# Patient Record
Sex: MALE | Race: WHITE | NOT HISPANIC OR LATINO | Employment: OTHER | URBAN - METROPOLITAN AREA
[De-identification: names, ages, dates, MRNs, and addresses within clinical notes are randomized per-mention and may not be internally consistent; named-entity substitution may affect disease eponyms.]

---

## 2017-02-09 ENCOUNTER — CLINICAL SUPPORT NO REQUIREMENTS (OUTPATIENT)
Dept: CARDIOLOGY | Facility: CLINIC | Age: 77
End: 2017-02-09

## 2017-02-09 DIAGNOSIS — I49.5 SICK SINUS SYNDROME (HCC): Primary | ICD-10-CM

## 2017-02-09 PROCEDURE — 93280 PM DEVICE PROGR EVAL DUAL: CPT | Performed by: INTERNAL MEDICINE

## 2017-03-17 RX ORDER — PRAVASTATIN SODIUM 40 MG
40 TABLET ORAL DAILY
Qty: 90 TABLET | Refills: 3 | Status: SHIPPED | OUTPATIENT
Start: 2017-03-17 | End: 2018-03-05 | Stop reason: SDUPTHER

## 2017-04-03 RX ORDER — LEVOTHYROXINE SODIUM 0.05 MG/1
TABLET ORAL
Qty: 90 TABLET | Refills: 0 | Status: SHIPPED | OUTPATIENT
Start: 2017-04-03 | End: 2017-12-15 | Stop reason: DRUGHIGH

## 2017-04-04 RX ORDER — LEVOTHYROXINE SODIUM 0.05 MG/1
TABLET ORAL
Qty: 90 TABLET | Refills: 1 | Status: SHIPPED | OUTPATIENT
Start: 2017-04-04 | End: 2017-04-04 | Stop reason: SDUPTHER

## 2017-04-04 RX ORDER — LEVOTHYROXINE SODIUM 0.05 MG/1
TABLET ORAL
Qty: 90 TABLET | Refills: 1 | Status: SHIPPED | OUTPATIENT
Start: 2017-04-04 | End: 2017-06-15

## 2017-06-14 ENCOUNTER — OFFICE VISIT (OUTPATIENT)
Dept: INTERNAL MEDICINE | Facility: CLINIC | Age: 77
End: 2017-06-14

## 2017-06-14 VITALS
RESPIRATION RATE: 18 BRPM | SYSTOLIC BLOOD PRESSURE: 110 MMHG | HEIGHT: 69 IN | DIASTOLIC BLOOD PRESSURE: 52 MMHG | HEART RATE: 77 BPM | WEIGHT: 157 LBS | BODY MASS INDEX: 23.25 KG/M2 | OXYGEN SATURATION: 98 %

## 2017-06-14 DIAGNOSIS — E78.49 OTHER HYPERLIPIDEMIA: Primary | ICD-10-CM

## 2017-06-14 DIAGNOSIS — E03.9 ACQUIRED HYPOTHYROIDISM: ICD-10-CM

## 2017-06-14 LAB
ALBUMIN SERPL-MCNC: 4.5 G/DL (ref 3.5–5.2)
ALBUMIN/GLOB SERPL: 2.3 G/DL
ALP SERPL-CCNC: 54 U/L (ref 40–129)
ALT SERPL-CCNC: 21 U/L (ref 5–41)
AST SERPL-CCNC: 39 U/L (ref 5–40)
BILIRUB SERPL-MCNC: 0.7 MG/DL (ref 0.2–1.2)
BUN SERPL-MCNC: 18 MG/DL (ref 8–23)
BUN/CREAT SERPL: 19.8 (ref 7–25)
CALCIUM SERPL-MCNC: 9.4 MG/DL (ref 8.8–10.5)
CHLORIDE SERPL-SCNC: 96 MMOL/L (ref 98–107)
CHOLEST SERPL-MCNC: 213 MG/DL (ref 0–200)
CHOLEST/HDLC SERPL: 2.29 {RATIO}
CO2 SERPL-SCNC: 26.9 MMOL/L (ref 22–29)
CREAT SERPL-MCNC: 0.91 MG/DL (ref 0.76–1.27)
ERYTHROCYTE [DISTWIDTH] IN BLOOD BY AUTOMATED COUNT: 12.2 % (ref 11.5–14.5)
GLOBULIN SER CALC-MCNC: 2 GM/DL
GLUCOSE SERPL-MCNC: 93 MG/DL (ref 65–99)
HCT VFR BLD AUTO: 41.6 % (ref 42–52)
HDLC SERPL-MCNC: 93 MG/DL (ref 40–60)
HGB BLD-MCNC: 14 G/DL (ref 14–18)
LDLC SERPL CALC-MCNC: 98 MG/DL (ref 0–100)
MCH RBC QN AUTO: 33 PG (ref 27–31)
MCHC RBC AUTO-ENTMCNC: 33.7 G/DL (ref 31–37)
MCV RBC AUTO: 98.1 FL (ref 80–94)
PLATELET # BLD AUTO: 239 10*3/MM3 (ref 140–500)
POTASSIUM SERPL-SCNC: 4.3 MMOL/L (ref 3.5–5.2)
PROT SERPL-MCNC: 6.5 G/DL (ref 6–8.5)
RBC # BLD AUTO: 4.24 10*6/MM3 (ref 4.7–6.1)
SODIUM SERPL-SCNC: 138 MMOL/L (ref 136–145)
T4 FREE SERPL-MCNC: 1.32 NG/DL (ref 0.93–1.7)
TRIGL SERPL-MCNC: 108 MG/DL (ref 0–150)
TSH SERPL DL<=0.005 MIU/L-ACNC: 4.8 MIU/ML (ref 0.27–4.2)
VLDLC SERPL CALC-MCNC: 21.6 MG/DL (ref 8–32)
WBC # BLD AUTO: 5.23 10*3/MM3 (ref 4.8–10.8)

## 2017-06-14 PROCEDURE — 99213 OFFICE O/P EST LOW 20 MIN: CPT | Performed by: INTERNAL MEDICINE

## 2017-06-14 NOTE — PATIENT INSTRUCTIONS
Assessment/Plan   Jean Claude was seen today for hyperlipidemia and hypothyroidism.    Diagnoses and all orders for this visit:    Other hyperlipidemia  -     Comprehensive metabolic panel  -     Lipid Panel w/ Chol/HDL Ratio  -     CBC No Differential    Acquired hypothyroidism  -     T4, free  -     TSH      Spinal exercises to help with diastasis that is mild and reducible. No current abdominal pain, but encourage some conditioning of the core.  Exercises discussed from [employer].

## 2017-06-14 NOTE — PROGRESS NOTES
Subjective   Jean Claude Ramirez is a 76 y.o. male.     History of Present Illness   75 yo male with pmhx HL, hypothyroidism, AR on immunotherapy biweekly and pacer in place for AV block.  His interogation is up to date  He is otherwise well, some weight gain but recent vacation.    The following portions of the patient's history were reviewed and updated as appropriate: allergies, current medications, past family history, past medical history, past social history, past surgical history and problem list.    Review of Systems   Constitutional: Negative.  Negative for activity change, appetite change, fatigue, fever and unexpected weight change.   HENT: Negative.  Negative for congestion, ear pain and voice change.    Respiratory: Negative.    Cardiovascular: Negative.  Negative for chest pain and palpitations.   Gastrointestinal: Negative.  Negative for diarrhea, nausea and vomiting.   Endocrine: Negative.    Genitourinary: Negative.  Negative for decreased urine volume and urgency.   Musculoskeletal: Negative.    Allergic/Immunologic: Negative.    Neurological: Negative for dizziness and headaches.   Hematological: Negative.    Psychiatric/Behavioral: Negative.    All other systems reviewed and are negative.      Objective   Physical Exam   HENT:   Head: Normocephalic.   Right Ear: External ear normal.   Left Ear: External ear normal.   Nose: Nose normal.   Mouth/Throat: Oropharynx is clear and moist.   Eyes: Pupils are equal, round, and reactive to light.   Cardiovascular: Normal rate and regular rhythm.    No murmur heard.  Abdominal: Soft.   Mild diastasis recti, reducible umbilical hernia.   No HSM   Psychiatric: He has a normal mood and affect. His behavior is normal.   Nursing note and vitals reviewed.      Assessment/Plan   Jean Claude was seen today for hyperlipidemia and hypothyroidism.    Diagnoses and all orders for this visit:    Other hyperlipidemia  -     Comprehensive metabolic panel  -     Lipid Panel w/ Chol/HDL  Ratio  -     CBC No Differential    Acquired hypothyroidism  -     T4, free  -     TSH      Spinal exercises to help with diastasis that is mild and reducible. No current abdominal pain, but encourage some conditioning of the core.  Exercises discussed from [email or web address].

## 2017-06-15 DIAGNOSIS — E03.9 ACQUIRED HYPOTHYROIDISM: Primary | ICD-10-CM

## 2017-06-15 RX ORDER — LEVOTHYROXINE SODIUM 0.07 MG/1
75 TABLET ORAL DAILY
Qty: 90 TABLET | Refills: 1 | Status: SHIPPED | OUTPATIENT
Start: 2017-06-15 | End: 2017-12-15 | Stop reason: SDUPTHER

## 2017-06-16 ENCOUNTER — TELEPHONE (OUTPATIENT)
Dept: INTERNAL MEDICINE | Facility: CLINIC | Age: 77
End: 2017-06-16

## 2017-06-16 NOTE — TELEPHONE ENCOUNTER
Patient notified, Dr. Davey sent meds into pharmacy yesterday.  ----- Message from Mahad Davey MD sent at 6/15/2017  8:33 AM EDT -----  Cholesterol is great. Need to tweak his levothyroxine a bit, go up to 75.  Remember his ab exercises.  Recheck 2-3 months labb only.  tsh and ft4

## 2017-08-22 ENCOUNTER — CLINICAL SUPPORT NO REQUIREMENTS (OUTPATIENT)
Dept: CARDIOLOGY | Facility: CLINIC | Age: 77
End: 2017-08-22

## 2017-08-22 ENCOUNTER — OFFICE VISIT (OUTPATIENT)
Dept: CARDIOLOGY | Facility: CLINIC | Age: 77
End: 2017-08-22

## 2017-08-22 VITALS
SYSTOLIC BLOOD PRESSURE: 146 MMHG | BODY MASS INDEX: 22.51 KG/M2 | HEIGHT: 69 IN | DIASTOLIC BLOOD PRESSURE: 82 MMHG | WEIGHT: 152 LBS | HEART RATE: 68 BPM

## 2017-08-22 DIAGNOSIS — E78.49 OTHER HYPERLIPIDEMIA: Primary | ICD-10-CM

## 2017-08-22 DIAGNOSIS — Z95.0 CARDIAC PACEMAKER: ICD-10-CM

## 2017-08-22 DIAGNOSIS — I49.5 SICK SINUS SYNDROME (HCC): ICD-10-CM

## 2017-08-22 PROCEDURE — 93280 PM DEVICE PROGR EVAL DUAL: CPT | Performed by: INTERNAL MEDICINE

## 2017-08-22 PROCEDURE — 99213 OFFICE O/P EST LOW 20 MIN: CPT | Performed by: INTERNAL MEDICINE

## 2017-08-24 PROCEDURE — 93000 ELECTROCARDIOGRAM COMPLETE: CPT | Performed by: INTERNAL MEDICINE

## 2017-08-24 NOTE — PROGRESS NOTES
Jean Claude NICO Page  1940  Date of Office Visit:   Encounter Provider: James Riddle MD  Place of Service: [facility] CARDIOLOGY    Chief complaint  1. Permanent dual chamber pacemaker.  2. High grade AV block.  3. Syncope.  History of Present Illness  Dear Dr. Davey:  I had the pleasure of seeing your patient in followup today. The patient is a very pleasant 77-year-old gentleman with a medical  history of what appears to be Mobitz II second degree heart block and perhaps even transient third degree AV block along with syncope. He underwent a Medtronic dual chamber pacemaker placement in January 2013 secondary to those findings. Since that time, he has  been doing very well and had no issues with his pacemaker.     In 06/2016 he was in the hospital at [facility] secondary to chest discomfort.  At that time, he underwent a stress test that showed no evidence of ischemia or infarction. He had a normal ejection fraction documented on his echo along with just mild mitral, aortic and tricuspid valve regurgitation.  Since that visit, he has been doing very well. He continues to be very active, and he has no issues with dyspnea on exertion or chest discomfort.      He denies any orthopnea, PND or lower extremity edema.  No recurrent syncope.              Review of Systems   Constitution: Negative for fever, weakness and malaise/fatigue.   HENT: Negative for nosebleeds and sore throat.    Eyes: Negative for blurred vision and double vision.   Cardiovascular: Negative for chest pain, claudication, palpitations and syncope.   Respiratory: Negative for cough, shortness of breath and snoring.    Endocrine: Negative for cold intolerance, heat intolerance and polydipsia.   Skin: Negative for itching, poor wound healing and rash.   Musculoskeletal: Negative for joint pain, joint swelling, muscle weakness and myalgias.   Gastrointestinal: Negative for abdominal pain, melena, nausea and  "vomiting.   Neurological: Negative for light-headedness, loss of balance, seizures and vertigo.   Psychiatric/Behavioral: Negative for altered mental status and depression.          Past Medical History:   Diagnosis Date   • Acquired hypothyroidism 5/24/2016   • Atypical chest pain 6/24/2016   • AV block    • Cancer 8/19/10    Prostate surgery   • H/O echocardiogram 01/25/2013   • Health care maintenance    • History of EKG 08/13/2015   • Hyperlipidemia 5/24/2016   • Mobitz type II block    • Pacemaker        The following portions of the patient's history were reviewed and updated as appropriate: Social history , Family history and Surgical history     Current Outpatient Prescriptions on File Prior to Visit   Medication Sig Dispense Refill   • ascorbic acid (VITAMIN C) 1000 MG tablet Take  by mouth daily.     • fexofenadine (ALLEGRA) 180 MG tablet Take 180 mg by mouth daily.     • fluticasone (FLONASE) 50 MCG/ACT nasal spray 2 sprays daily.     • levothyroxine (SYNTHROID) 75 MCG tablet Take 1 tablet by mouth Daily. 90 tablet 1   • levothyroxine (SYNTHROID, LEVOTHROID) 50 MCG tablet TAKE ONE TABLET BY MOUTH ONCE DAILY 90 tablet 0   • Multiple Vitamin tablet Take  by mouth.     • pravastatin (PRAVACHOL) 40 MG tablet Take 1 tablet by mouth Daily. 90 tablet 3     No current facility-administered medications on file prior to visit.        Allergies   Allergen Reactions   • Aspirin Anaphylaxis       Vitals:    08/22/17 1501   BP: 146/82   Pulse: 68   Weight: 152 lb (68.9 kg)   Height: 69\" (175.3 cm)     Physical Exam   Constitutional: He is oriented to person, place, and time. He appears well-developed and well-nourished.   HENT:   Head: Normocephalic and atraumatic.   Eyes: Conjunctivae and EOM are normal. No scleral icterus.   Neck: Normal range of motion. Neck supple. Normal carotid pulses, no hepatojugular reflux and no JVD present. Carotid bruit is not present. No tracheal deviation present. No thyromegaly present. "   Cardiovascular: Normal rate and regular rhythm.  Exam reveals no gallop and no friction rub.    No murmur heard.  Pulses:       Carotid pulses are 2+ on the right side, and 2+ on the left side.       Radial pulses are 2+ on the right side, and 2+ on the left side.        Femoral pulses are 2+ on the right side, and 2+ on the left side.       Dorsalis pedis pulses are 2+ on the right side, and 2+ on the left side.        Posterior tibial pulses are 2+ on the right side, and 2+ on the left side.   Pulmonary/Chest: Breath sounds normal. No respiratory distress. He has no decreased breath sounds. He has no wheezes. He has no rhonchi. He has no rales. He exhibits no tenderness.   Abdominal: Soft. Bowel sounds are normal. He exhibits no distension. There is no tenderness. There is no rebound.   Musculoskeletal: He exhibits no edema or deformity.   Neurological: He is alert and oriented to person, place, and time. He has normal strength. No sensory deficit.   Skin: No rash noted. No erythema.        Psychiatric: He has a normal mood and affect. His behavior is normal.       No components found for: CBC  No results found for: CMP  No components found for: LIPID  No results found for: BMP      ECG 12 Lead  Date/Time: 8/24/2017 6:38 PM  Performed by: GABBY ZAYAS  Authorized by: GABBY ZAYAS   Comparison: compared with previous ECG from 8/4/2016  Similar to previous ECG  Comments: Atrial paced. Ventricular sensed                  6/22/16 Stress  IMPRESSION:   1. No scintigraphic evidence of Lexiscan-induced ischemia.   2. No scintigraphic evidence of prior myocardial infarction.   3. Preserved left ventricular ejection fraction at 78% with normal wall   motion and thickness.   4. Normal Lexiscan Cardiolite stress test.     TTE 6/22/16  Conclusions:  The left ventricular chamber size is normal.  Borderline concentric left ventricular hypertrophy.  The estimated ejection fraction is 60-65%.   There is an E  to A reversal in the mitral valve flow pattern suggestive  of diastolic dysfunction.  The left atrium is mildly enlarged.  A pacemaker wire is visualized in the right ventricle.  There is mild aortic regurgitation.   There is mild mitral regurgitation.   There is mild tricuspid regurgitation.  The right ventricular systolic pressure is estimated to be 35-40 mmHg.   There is evidence of mild pulmonary hypertension.    DISCUSSION/SUMMARY   77-year-old gentleman with a medical history as documented above including AV block, pacemaker placement as documented above, and recent evaluation for chest pain in 06/2016 who presents back for followup.  He remains active, walking 2-3 miles a day without any dyspnea either.  Overall, I feel like he is doing well and I do not think he needs any alteration of his medical management.        1. Dual chamber Medtronic pacemaker placement secondary to AV block.  Continue pacemaker monitoring in our clinic.      2. Hyperlipidemia:  The patient is currently on Pravastatin at 40 mg po q.hs.  He recently had a lipid panel in 06/2017 that  showed his HDL was great at 93, and his LDL was reasonable at 98.  I do not think he needs alteration of that therapy at this  point in time.      I will plan on seeing him back in one year, or earlier if problems.

## 2017-09-20 ENCOUNTER — LAB (OUTPATIENT)
Dept: INTERNAL MEDICINE | Facility: CLINIC | Age: 77
End: 2017-09-20

## 2017-09-20 DIAGNOSIS — E03.9 HYPOTHYROIDISM, UNSPECIFIED TYPE: Primary | ICD-10-CM

## 2017-09-20 LAB
T4 FREE SERPL-MCNC: 1.4 NG/DL (ref 0.93–1.7)
TSH SERPL DL<=0.005 MIU/L-ACNC: 1.36 MIU/ML (ref 0.27–4.2)

## 2017-09-21 ENCOUNTER — TELEPHONE (OUTPATIENT)
Dept: INTERNAL MEDICINE | Facility: CLINIC | Age: 77
End: 2017-09-21

## 2017-09-21 NOTE — TELEPHONE ENCOUNTER
Left message with results on patients voicemail.   ----- Message from Mahad Davey MD sent at 9/20/2017  4:58 PM EDT -----  Thyroid is now good

## 2017-11-29 ENCOUNTER — CLINICAL SUPPORT NO REQUIREMENTS (OUTPATIENT)
Dept: CARDIOLOGY | Facility: CLINIC | Age: 77
End: 2017-11-29

## 2017-11-29 DIAGNOSIS — I49.5 SICK SINUS SYNDROME (HCC): Primary | ICD-10-CM

## 2017-11-29 PROCEDURE — 93296 REM INTERROG EVL PM/IDS: CPT | Performed by: INTERNAL MEDICINE

## 2017-11-29 PROCEDURE — 93294 REM INTERROG EVL PM/LDLS PM: CPT | Performed by: INTERNAL MEDICINE

## 2017-12-15 ENCOUNTER — OFFICE VISIT (OUTPATIENT)
Dept: INTERNAL MEDICINE | Facility: CLINIC | Age: 77
End: 2017-12-15

## 2017-12-15 VITALS
SYSTOLIC BLOOD PRESSURE: 144 MMHG | HEIGHT: 69 IN | RESPIRATION RATE: 16 BRPM | OXYGEN SATURATION: 96 % | HEART RATE: 73 BPM | DIASTOLIC BLOOD PRESSURE: 76 MMHG | WEIGHT: 154.4 LBS | BODY MASS INDEX: 22.87 KG/M2

## 2017-12-15 DIAGNOSIS — E03.9 ACQUIRED HYPOTHYROIDISM: ICD-10-CM

## 2017-12-15 DIAGNOSIS — Z00.00 MEDICARE ANNUAL WELLNESS VISIT, SUBSEQUENT: Primary | ICD-10-CM

## 2017-12-15 DIAGNOSIS — C61 PROSTATE CANCER (HCC): ICD-10-CM

## 2017-12-15 DIAGNOSIS — E78.49 OTHER HYPERLIPIDEMIA: ICD-10-CM

## 2017-12-15 LAB
ALBUMIN SERPL-MCNC: 4.5 G/DL (ref 3.5–5.2)
ALBUMIN/GLOB SERPL: 2 G/DL
ALP SERPL-CCNC: 49 U/L (ref 40–129)
ALT SERPL-CCNC: 15 U/L (ref 5–41)
AST SERPL-CCNC: 27 U/L (ref 5–40)
BASOPHILS # BLD AUTO: 0.04 10*3/MM3 (ref 0–0.2)
BASOPHILS NFR BLD AUTO: 0.9 % (ref 0–2)
BILIRUB SERPL-MCNC: 0.7 MG/DL (ref 0.2–1.2)
BUN SERPL-MCNC: 14 MG/DL (ref 8–23)
BUN/CREAT SERPL: 15.9 (ref 7–25)
CALCIUM SERPL-MCNC: 9.6 MG/DL (ref 8.8–10.5)
CHLORIDE SERPL-SCNC: 98 MMOL/L (ref 98–107)
CHOLEST SERPL-MCNC: 194 MG/DL (ref 0–200)
CO2 SERPL-SCNC: 31.5 MMOL/L (ref 22–29)
CREAT SERPL-MCNC: 0.88 MG/DL (ref 0.76–1.27)
EOSINOPHIL # BLD AUTO: 0.32 10*3/MM3 (ref 0.1–0.3)
EOSINOPHIL NFR BLD AUTO: 7.1 % (ref 0–4)
ERYTHROCYTE [DISTWIDTH] IN BLOOD BY AUTOMATED COUNT: 11.8 % (ref 11.5–14.5)
GFR SERPLBLD CREATININE-BSD FMLA CKD-EPI: 102 ML/MIN/1.73
GFR SERPLBLD CREATININE-BSD FMLA CKD-EPI: 84 ML/MIN/1.73
GLOBULIN SER CALC-MCNC: 2.2 GM/DL
GLUCOSE SERPL-MCNC: 88 MG/DL (ref 65–99)
HCT VFR BLD AUTO: 41.8 % (ref 42–52)
HDLC SERPL-MCNC: 82 MG/DL (ref 40–60)
HGB BLD-MCNC: 14 G/DL (ref 14–18)
IMM GRANULOCYTES # BLD: 0.01 10*3/MM3 (ref 0–0.03)
IMM GRANULOCYTES NFR BLD: 0.2 % (ref 0–0.5)
LDLC SERPL CALC-MCNC: 98 MG/DL (ref 0–100)
LDLC/HDLC SERPL: 1.19 {RATIO}
LYMPHOCYTES # BLD AUTO: 1.39 10*3/MM3 (ref 0.6–4.8)
LYMPHOCYTES NFR BLD AUTO: 30.9 % (ref 20–45)
MCH RBC QN AUTO: 33.8 PG (ref 27–31)
MCHC RBC AUTO-ENTMCNC: 33.5 G/DL (ref 31–37)
MCV RBC AUTO: 101 FL (ref 80–94)
MONOCYTES # BLD AUTO: 0.5 10*3/MM3 (ref 0–1)
MONOCYTES NFR BLD AUTO: 11.1 % (ref 3–8)
NEUTROPHILS # BLD AUTO: 2.24 10*3/MM3 (ref 1.5–8.3)
NEUTROPHILS NFR BLD AUTO: 49.8 % (ref 45–70)
NRBC BLD AUTO-RTO: 0 /100 WBC (ref 0–0)
PLATELET # BLD AUTO: 196 10*3/MM3 (ref 140–500)
POTASSIUM SERPL-SCNC: 4.6 MMOL/L (ref 3.5–5.2)
PROT SERPL-MCNC: 6.7 G/DL (ref 6–8.5)
PSA SERPL-MCNC: <0.014 NG/ML (ref 0–4)
RBC # BLD AUTO: 4.14 10*6/MM3 (ref 4.7–6.1)
SODIUM SERPL-SCNC: 139 MMOL/L (ref 136–145)
TRIGL SERPL-MCNC: 72 MG/DL (ref 0–150)
TSH SERPL DL<=0.005 MIU/L-ACNC: 1.27 MIU/ML (ref 0.27–4.2)
VLDLC SERPL CALC-MCNC: 14.4 MG/DL (ref 8–32)
WBC # BLD AUTO: 4.5 10*3/MM3 (ref 4.8–10.8)

## 2017-12-15 PROCEDURE — 99213 OFFICE O/P EST LOW 20 MIN: CPT | Performed by: INTERNAL MEDICINE

## 2017-12-15 PROCEDURE — G0439 PPPS, SUBSEQ VISIT: HCPCS | Performed by: INTERNAL MEDICINE

## 2017-12-15 RX ORDER — LEVOTHYROXINE SODIUM 0.07 MG/1
TABLET ORAL
Qty: 90 TABLET | Refills: 1 | Status: SHIPPED | OUTPATIENT
Start: 2017-12-15 | End: 2018-06-14 | Stop reason: SDUPTHER

## 2017-12-15 NOTE — PROGRESS NOTES
QUICK REFERENCE INFORMATION:  The ABCs of the Annual Wellness Visit    Subsequent Medicare Wellness Visit    HEALTH RISK ASSESSMENT    1940    Recent Hospitalizations:  No hospitalization(s) within the last year..        Current Medical Providers:  Patient Care Team:  Mahad Davey MD as PCP - General  Mahad Davey MD as PCP - Claims Attributed        Smoking Status:  History   Smoking Status   • Never Smoker   Smokeless Tobacco   • Not on file       Alcohol Consumption:  History   Alcohol Use   • Yes     Comment: Social Drinker       Depression Screen:   PHQ-2/PHQ-9 Depression Screening 12/15/2017   Little interest or pleasure in doing things 0   Feeling down, depressed, or hopeless 0   Total Score 0       Health Habits and Functional and Cognitive Screening:  Functional & Cognitive Status 12/15/2017   Do you have difficulty preparing food and eating? No   Do you have difficulty bathing yourself, getting dressed or grooming yourself? No   Do you have difficulty using the toilet? No   Do you have difficulty moving around from place to place? No   Do you have trouble with steps or getting out of a bed or a chair? No   In the past year have you fallen or experienced a near fall? No   Do you need help using the phone?  No   Are you deaf or do you have serious difficulty hearing?  No   Do you need help with transportation? No   Do you need help shopping? No   Do you need help preparing meals?  No   Do you need help with housework?  No   Do you need help with laundry? No   Do you need help taking your medications? No   Do you need help managing money? No   Have you felt unusual stress, anger or loneliness in the last month? No   Who do you live with? Spouse   If you need help, do you have trouble finding someone available to you? Yes   Have you been bothered in the last four weeks by sexual problems? No   Do you have difficulty concentrating, remembering or making decisions? No           Does the patient have  evidence of cognitive impairment? No, he is less sharp with names    Aspirin use counseling: Does not need ASA (and currently is not on it)      Recent Lab Results:  CMP:  Lab Results   Component Value Date    GLU 93 06/14/2017    BUN 18 06/14/2017    CREATININE 0.91 06/14/2017    EGFRIFNONA 81 06/14/2017    EGFRIFAFRI 98 06/14/2017    BCR 19.8 06/14/2017     06/14/2017    K 4.3 06/14/2017    CO2 26.9 06/14/2017    CALCIUM 9.4 06/14/2017    PROTENTOTREF 6.5 06/14/2017    ALBUMIN 4.50 06/14/2017    LABGLOBREF 2.0 06/14/2017    LABIL2 2.3 06/14/2017    BILITOT 0.7 06/14/2017    ALKPHOS 54 06/14/2017    AST 39 06/14/2017    ALT 21 06/14/2017     Lipid Panel:  Lab Results   Component Value Date    TRIG 108 06/14/2017    HDL 93 (H) 06/14/2017    VLDL 21.6 06/14/2017     HbA1c:       Visual Acuity:  No exam data present    Age-appropriate Screening Schedule:  Refer to the list below for future screening recommendations based on patient's age, sex and/or medical conditions. Orders for these recommended tests are listed in the plan section. The patient has been provided with a written plan.    Health Maintenance   Topic Date Due   • PNEUMOCOCCAL VACCINES (65+ LOW/MEDIUM RISK) (2 of 2 - PPSV23) 04/13/2017   • LIPID PANEL  06/14/2018   • TDAP/TD VACCINES (2 - Td) 10/03/2022   • COLONOSCOPY  07/18/2026   • INFLUENZA VACCINE  Completed   • ZOSTER VACCINE  Addressed        Subjective   History of Present Illness    Jean Claude Ramirez is a 77 y.o. male who presents for an Subsequent Wellness Visit.  Mr Ramirez is also here for FU of his hypothyroidism ( no cp or palpitations) and HL, would like fasting labs done today.    He denies any new myalgia or joint pain    He remains very active exercising daily.    The following portions of the patient's history were reviewed and updated as appropriate: allergies, current medications, past family history, past medical history, past social history, past surgical history and problem  list.    Outpatient Medications Prior to Visit   Medication Sig Dispense Refill   • ascorbic acid (VITAMIN C) 1000 MG tablet Take  by mouth daily.     • fexofenadine (ALLEGRA) 180 MG tablet Take 180 mg by mouth daily.     • fluticasone (FLONASE) 50 MCG/ACT nasal spray 2 sprays daily.     • levothyroxine (SYNTHROID) 75 MCG tablet Take 1 tablet by mouth Daily. 90 tablet 1   • Multiple Vitamin tablet Take  by mouth.     • pravastatin (PRAVACHOL) 40 MG tablet Take 1 tablet by mouth Daily. 90 tablet 3   • levothyroxine (SYNTHROID, LEVOTHROID) 50 MCG tablet TAKE ONE TABLET BY MOUTH ONCE DAILY 90 tablet 0     No facility-administered medications prior to visit.        Patient Active Problem List   Diagnosis   • Acquired hypothyroidism   • Hyperlipidemia   • Syncope   • Atypical chest pain   • Cardiac pacemaker   • Medicare annual wellness visit, subsequent       Advance Care Planning:  power of  for healthcare on file, wife     Identification of Risk Factors:  Risk factors include: none, very active doing 2miles per day. Does concurrent spine strenghtening exercises, compliant with plank    Review of Systems   Constitutional: Negative.    HENT: Negative.    Respiratory: Negative.    Cardiovascular: Negative.    Genitourinary: Negative.    Musculoskeletal: Negative.    Psychiatric/Behavioral: Negative.        Compared to one year ago, the patient feels his physical health is the same.  Compared to one year ago, the patient feels his mental health is the same.    Objective     Physical Exam   Constitutional: He appears well-developed and well-nourished.   HENT:   Head: Normocephalic and atraumatic.   Right Ear: External ear normal.   Left Ear: External ear normal.   Mouth/Throat: No oropharyngeal exudate.   Eyes: EOM are normal. Pupils are equal, round, and reactive to light.   Neck: No thyromegaly present.   Cardiovascular: Normal rate and regular rhythm.    No murmur heard.  Pulmonary/Chest: Effort normal. No  "respiratory distress.   Neurological: He is alert.   Psychiatric: He has a normal mood and affect. His behavior is normal.   Vitals reviewed.      Vitals:    12/15/17 0847   BP: 144/76   Pulse: 73   Resp: 16   SpO2: 96%   Weight: 70 kg (154 lb 6.4 oz)   Height: 175.3 cm (69\")       Body mass index is 22.8 kg/(m^2).  Discussed the patient's BMI with him. BMI is within normal parameters. No follow-up required.    Assessment/Plan   Patient Self-Management and Personalized Health Advice  The patient has been provided with information about: nothing doing great and preventive services including:   · discussed new shingles vaccine, defer for now.    Visit Diagnoses:    ICD-10-CM ICD-9-CM   1. Medicare annual wellness visit, subsequent Z00.00 V70.0   2. Other hyperlipidemia E78.4 272.4   3. Acquired hypothyroidism E03.9 244.9   4. Prostate cancer C61 185       Orders Placed This Encounter   Procedures   • Comprehensive metabolic panel   • Lipid Panel With LDL/HDL Ratio   • TSH   • PSA   • CBC w AUTO Differential     Order Specific Question:   Manual Differential     Answer:   No       Outpatient Encounter Prescriptions as of 12/15/2017   Medication Sig Dispense Refill   • ascorbic acid (VITAMIN C) 1000 MG tablet Take  by mouth daily.     • fexofenadine (ALLEGRA) 180 MG tablet Take 180 mg by mouth daily.     • fluticasone (FLONASE) 50 MCG/ACT nasal spray 2 sprays daily.     • levothyroxine (SYNTHROID) 75 MCG tablet Take 1 tablet by mouth Daily. 90 tablet 1   • Multiple Vitamin tablet Take  by mouth.     • pravastatin (PRAVACHOL) 40 MG tablet Take 1 tablet by mouth Daily. 90 tablet 3   • [DISCONTINUED] levothyroxine (SYNTHROID, LEVOTHROID) 50 MCG tablet TAKE ONE TABLET BY MOUTH ONCE DAILY 90 tablet 0     No facility-administered encounter medications on file as of 12/15/2017.        Reviewed use of high risk medication in the elderly: yes  Reviewed for potential of harmful drug interactions in the elderly: yes    Follow " Up:  No Follow-up on file.     An After Visit Summary and PPPS with all of these plans were given to the patient.      HL - check lipid today  Continue and refill pravastatin 40 mg po qhs    Hypothyroidism, check TSH and fT4, call with result, fill pending result.

## 2017-12-15 NOTE — PATIENT INSTRUCTIONS
Medicare Wellness  Personal Prevention Plan of Service     Date of Office Visit:  12/15/2017  Encounter Provider:  Mahad Davey MD  Place of Service:  [facility] INTERNAL MED AND PEDS  Patient Name: Jean Claude Ramirez  :  1940    As part of the Medicare Wellness portion of your visit today, we are providing you with this personalized preventive plan of services (PPPS). This plan is based upon recommendations of the [place] Preventive Services Task Force (USPSTF) and the Advisory Committee on Immunization Practices (ACIP).    This lists the preventive care services that should be considered, and provides dates of when you are due. Items listed as completed are up-to-date and do not require any further intervention.    Health Maintenance   Topic Date Due   • MEDICARE ANNUAL WELLNESS  10/15/2016   • PNEUMOCOCCAL VACCINES (65+ LOW/MEDIUM RISK) (2 of 2 - PPSV23) 2017   • LIPID PANEL  2018   • TDAP/TD VACCINES (2 - Td) 10/03/2022   • COLONOSCOPY  2026   • INFLUENZA VACCINE  Completed   • ZOSTER VACCINE  Addressed       Orders Placed This Encounter   Procedures   • Comprehensive metabolic panel   • Lipid Panel With LDL/HDL Ratio   • TSH   • PSA   • CBC w AUTO Differential     Order Specific Question:   Manual Differential     Answer:   No       No Follow-up on file.

## 2017-12-20 ENCOUNTER — TELEPHONE (OUTPATIENT)
Dept: INTERNAL MEDICINE | Facility: CLINIC | Age: 77
End: 2017-12-20

## 2017-12-20 NOTE — TELEPHONE ENCOUNTER
Patient advised of results.   ----- Message from Mahad Davey MD sent at 12/19/2017  4:57 PM EST -----  Labs are really good, reiterate need for B complex as he is looking b12 deficient

## 2018-02-01 RX ORDER — OSELTAMIVIR PHOSPHATE 75 MG/1
75 CAPSULE ORAL DAILY
Qty: 10 CAPSULE | Refills: 0 | Status: SHIPPED | OUTPATIENT
Start: 2018-02-01 | End: 2018-08-17

## 2018-03-02 ENCOUNTER — CLINICAL SUPPORT NO REQUIREMENTS (OUTPATIENT)
Dept: CARDIOLOGY | Facility: CLINIC | Age: 78
End: 2018-03-02

## 2018-03-02 DIAGNOSIS — I49.5 SICK SINUS SYNDROME (HCC): Primary | ICD-10-CM

## 2018-03-02 PROCEDURE — 93280 PM DEVICE PROGR EVAL DUAL: CPT | Performed by: INTERNAL MEDICINE

## 2018-03-05 RX ORDER — PRAVASTATIN SODIUM 40 MG
TABLET ORAL
Qty: 90 TABLET | Refills: 3 | Status: SHIPPED | OUTPATIENT
Start: 2018-03-05 | End: 2019-02-24 | Stop reason: SDUPTHER

## 2018-06-04 ENCOUNTER — CLINICAL SUPPORT NO REQUIREMENTS (OUTPATIENT)
Dept: CARDIOLOGY | Facility: CLINIC | Age: 78
End: 2018-06-04

## 2018-06-04 DIAGNOSIS — I49.5 SICK SINUS SYNDROME (HCC): Primary | ICD-10-CM

## 2018-06-04 PROCEDURE — 93296 REM INTERROG EVL PM/IDS: CPT | Performed by: INTERNAL MEDICINE

## 2018-06-04 PROCEDURE — 93294 REM INTERROG EVL PM/LDLS PM: CPT | Performed by: INTERNAL MEDICINE

## 2018-06-14 DIAGNOSIS — E03.9 ACQUIRED HYPOTHYROIDISM: ICD-10-CM

## 2018-06-14 RX ORDER — LEVOTHYROXINE SODIUM 0.07 MG/1
TABLET ORAL
Qty: 90 TABLET | Refills: 1 | Status: SHIPPED | OUTPATIENT
Start: 2018-06-14 | End: 2018-12-06 | Stop reason: SDUPTHER

## 2018-08-16 ENCOUNTER — TELEPHONE (OUTPATIENT)
Dept: INTERNAL MEDICINE | Facility: CLINIC | Age: 78
End: 2018-08-16

## 2018-08-16 NOTE — TELEPHONE ENCOUNTER
----- Message from Magali Chavis MA sent at 8/16/2018  1:38 PM EDT -----  Regarding: FW: X-RAY VS APPT FIRST   Sorry I am just now able to reply to this, if someone has an opening for tomorrow we need to work him in..If not, can we just call and advise him to go to maybe [facility] or something similar?  ----- Message -----  From: Judith Esquivel MA  Sent: 8/15/2018   3:38 PM  To: Magali Chavis MA  Subject: FW: X-RAY VS APPT FIRST                              ----- Message -----  From: Lyudmila Bhardwaj  Sent: 8/15/2018   3:11 PM  To: [facility]  Subject: X-RAY VS APPT FIRST                              JEREMIAH    Patient has swollen left foot on the sole and swollen 2nd toe.  Does not know of any injury.  Walks in the morning and was unable to complete walk today.  Sees Dr. Davey but is okay seeing any other provider.  Pt asks if he should have x-ray or see provider.    [phone]    Pt is available Thursday afternoon and potentially Friday.

## 2018-08-17 ENCOUNTER — HOSPITAL ENCOUNTER (OUTPATIENT)
Dept: GENERAL RADIOLOGY | Facility: HOSPITAL | Age: 78
Discharge: HOME | End: 2018-08-17
Attending: INTERNAL MEDICINE | Admitting: INTERNAL MEDICINE

## 2018-08-17 ENCOUNTER — OFFICE VISIT (OUTPATIENT)
Dept: INTERNAL MEDICINE | Facility: CLINIC | Age: 78
End: 2018-08-17

## 2018-08-17 VITALS
BODY MASS INDEX: 22.36 KG/M2 | OXYGEN SATURATION: 99 % | RESPIRATION RATE: 14 BRPM | HEART RATE: 78 BPM | SYSTOLIC BLOOD PRESSURE: 134 MMHG | DIASTOLIC BLOOD PRESSURE: 70 MMHG | HEIGHT: 69 IN | WEIGHT: 151 LBS

## 2018-08-17 DIAGNOSIS — M79.675 TOE PAIN, LEFT: Primary | ICD-10-CM

## 2018-08-17 DIAGNOSIS — M79.675 PAIN AND SWELLING OF TOE OF LEFT FOOT: ICD-10-CM

## 2018-08-17 DIAGNOSIS — M79.89 PAIN AND SWELLING OF TOE OF LEFT FOOT: ICD-10-CM

## 2018-08-17 PROCEDURE — 99214 OFFICE O/P EST MOD 30 MIN: CPT | Performed by: INTERNAL MEDICINE

## 2018-08-17 PROCEDURE — 73660 X-RAY EXAM OF TOE(S): CPT

## 2018-08-17 NOTE — PROGRESS NOTES
X-ray of the toe is all normal except some minimal soft tissue swelling. F/u with podiatry. Either can see Dr. Robin or Sathya in [place] close to where he lives.

## 2018-08-17 NOTE — PROGRESS NOTES
"Jean Claude Ramirez is a 78 y.o. male, who presents with a chief complaint of   Chief Complaint   Patient presents with   • Toe Pain     left        79 yo M with history gout (mainly in right foot) here with left 2nd toe pain, redness and swelling that started Wednesday morning. Tender to touch and still somewhat painful to walk on which is better. He took a couple Tylenol that helped with the pain. No trauma.          The following portions of the patient's history were reviewed and updated as appropriate: allergies, current medications, past family history, past medical history, past social history, past surgical history and problem list.    Allergies: Aspirin    Current Outpatient Prescriptions:   •  ascorbic acid (VITAMIN C) 1000 MG tablet, Take  by mouth daily., Disp: , Rfl:   •  fexofenadine (ALLEGRA) 180 MG tablet, Take 180 mg by mouth daily., Disp: , Rfl:   •  fluticasone (FLONASE) 50 MCG/ACT nasal spray, 2 sprays daily., Disp: , Rfl:   •  levothyroxine (SYNTHROID, LEVOTHROID) 75 MCG tablet, TAKE ONE TABLET BY MOUTH ONCE DAILY, Disp: 90 tablet, Rfl: 1  •  Multiple Vitamin tablet, Take  by mouth., Disp: , Rfl:   •  pravastatin (PRAVACHOL) 40 MG tablet, TAKE ONE TABLET BY MOUTH ONCE DAILY, Disp: 90 tablet, Rfl: 3  Medications Discontinued During This Encounter   Medication Reason   • oseltamivir (TAMIFLU) 75 MG capsule *Therapy completed       Review of Systems   Constitutional: Negative for chills, fatigue and fever.   Musculoskeletal: Positive for arthralgias (2nd digit toe swelling and pain that has resolved ) and gait problem.             /70   Pulse 78   Resp 14   Ht 175.3 cm (69\")   Wt 68.5 kg (151 lb)   SpO2 99%   BMI 22.30 kg/m²       Physical Exam   Constitutional: He is oriented to person, place, and time. He appears well-developed and well-nourished. No distress.   HENT:   Head: Normocephalic and atraumatic.   Right Ear: External ear normal.   Left Ear: External ear normal.   Mouth/Throat: " Oropharynx is clear and moist. No oropharyngeal exudate.   Eyes: Conjunctivae are normal. Right eye exhibits no discharge. Left eye exhibits no discharge. No scleral icterus.   Pulmonary/Chest: Effort normal. He has no rales.   Musculoskeletal:   Left 2nd toe reduced ROM as well as some swelling at base with formation of hard dime size lesion. Possible tophus versus cyst.    Neurological: He is alert and oriented to person, place, and time.   Skin: Skin is warm. Capillary refill takes less than 2 seconds. No rash noted.   Psychiatric: He has a normal mood and affect. His behavior is normal.   Nursing note and vitals reviewed.        Results for orders placed or performed in visit on 12/15/17   Comprehensive metabolic panel   Result Value Ref Range    Glucose 88 65 - 99 mg/dL    BUN 14 8 - 23 mg/dL    Creatinine 0.88 0.76 - 1.27 mg/dL    eGFR Non African Am 84 >60 mL/min/1.73    eGFR African Am 102 >60 mL/min/1.73    BUN/Creatinine Ratio 15.9 7.0 - 25.0    Sodium 139 136 - 145 mmol/L    Potassium 4.6 3.5 - 5.2 mmol/L    Chloride 98 98 - 107 mmol/L    Total CO2 31.5 (H) 22.0 - 29.0 mmol/L    Calcium 9.6 8.8 - 10.5 mg/dL    Total Protein 6.7 6.0 - 8.5 g/dL    Albumin 4.50 3.50 - 5.20 g/dL    Globulin 2.2 gm/dL    A/G Ratio 2.0 g/dL    Total Bilirubin 0.7 0.2 - 1.2 mg/dL    Alkaline Phosphatase 49 40 - 129 U/L    AST (SGOT) 27 5 - 40 U/L    ALT (SGPT) 15 5 - 41 U/L   Lipid Panel With LDL/HDL Ratio   Result Value Ref Range    Total Cholesterol 194 0 - 200 mg/dL    Triglycerides 72 0 - 150 mg/dL    HDL Cholesterol 82 (H) 40 - 60 mg/dL    VLDL Cholesterol 14.4 8 - 32 mg/dL    LDL Cholesterol  98 0 - 100 mg/dL    LDL/HDL Ratio 1.19    TSH   Result Value Ref Range    TSH 1.270 0.270 - 4.200 mIU/mL   PSA   Result Value Ref Range    PSA <0.014 0.000 - 4.000 ng/mL   CBC w AUTO Differential   Result Value Ref Range    WBC 4.50 (L) 4.80 - 10.80 10*3/mm3    RBC 4.14 (L) 4.70 - 6.10 10*6/mm3    Hemoglobin 14.0 14.0 - 18.0 g/dL     Hematocrit 41.8 (L) 42.0 - 52.0 %    .0 (H) 80.0 - 94.0 fL    MCH 33.8 (H) 27.0 - 31.0 pg    MCHC 33.5 31.0 - 37.0 g/dL    RDW 11.8 11.5 - 14.5 %    Platelets 196 140 - 500 10*3/mm3    Neutrophil Rel % 49.8 45.0 - 70.0 %    Lymphocyte Rel % 30.9 20.0 - 45.0 %    Monocyte Rel % 11.1 (H) 3.0 - 8.0 %    Eosinophil Rel % 7.1 (H) 0.0 - 4.0 %    Basophil Rel % 0.9 0.0 - 2.0 %    Neutrophils Absolute 2.24 1.50 - 8.30 10*3/mm3    Lymphocytes Absolute 1.39 0.60 - 4.80 10*3/mm3    Monocytes Absolute 0.50 0.00 - 1.00 10*3/mm3    Eosinophils Absolute 0.32 (H) 0.10 - 0.30 10*3/mm3    Basophils Absolute 0.04 0.00 - 0.20 10*3/mm3    Immature Granulocyte Rel % 0.2 0.0 - 0.5 %    Immature Grans Absolute 0.01 0.00 - 0.03 10*3/mm3    nRBC 0.0 0.0 - 0.0 /100 WBC           Jean Claude was seen today for toe pain.    Diagnoses and all orders for this visit:    Toe pain, left  -     XR toe 2+ vw left    Pain and swelling of toe of left foot  -     XR toe 2+ vw left      X-ray to evaluate for cyst or typhi  Based on findings, may refer to podiatry in [place]   Supportive care and tylenol as needed for pain     Return if symptoms worsen or fail to improve.    Cheryl Cordero MD  08/17/2018

## 2018-08-20 ENCOUNTER — TELEPHONE (OUTPATIENT)
Dept: INTERNAL MEDICINE | Facility: CLINIC | Age: 78
End: 2018-08-20

## 2018-08-20 NOTE — TELEPHONE ENCOUNTER
LVM with details and to call with questions or concerns.     ----- Message from Cheryl Cordero MD sent at 8/17/2018 11:49 AM EDT -----  X-ray of the toe is all normal except some minimal soft tissue swelling. F/u with podiatry. Either can see Dr. Robin or Sathya in [place] close to where he lives.

## 2018-08-23 ENCOUNTER — CLINICAL SUPPORT NO REQUIREMENTS (OUTPATIENT)
Dept: CARDIOLOGY | Facility: CLINIC | Age: 78
End: 2018-08-23

## 2018-08-23 ENCOUNTER — OFFICE VISIT (OUTPATIENT)
Dept: CARDIOLOGY | Facility: CLINIC | Age: 78
End: 2018-08-23

## 2018-08-23 VITALS
WEIGHT: 151 LBS | BODY MASS INDEX: 22.36 KG/M2 | HEART RATE: 71 BPM | DIASTOLIC BLOOD PRESSURE: 78 MMHG | HEIGHT: 69 IN | SYSTOLIC BLOOD PRESSURE: 136 MMHG

## 2018-08-23 DIAGNOSIS — Z95.0 PACEMAKER: Primary | ICD-10-CM

## 2018-08-23 DIAGNOSIS — Z95.0 CARDIAC PACEMAKER: ICD-10-CM

## 2018-08-23 DIAGNOSIS — I49.5 SICK SINUS SYNDROME (HCC): Primary | ICD-10-CM

## 2018-08-23 DIAGNOSIS — E78.49 OTHER HYPERLIPIDEMIA: ICD-10-CM

## 2018-08-23 PROCEDURE — 99213 OFFICE O/P EST LOW 20 MIN: CPT | Performed by: INTERNAL MEDICINE

## 2018-08-23 PROCEDURE — 93280 PM DEVICE PROGR EVAL DUAL: CPT | Performed by: INTERNAL MEDICINE

## 2018-08-23 PROCEDURE — 93000 ELECTROCARDIOGRAM COMPLETE: CPT | Performed by: INTERNAL MEDICINE

## 2018-08-23 NOTE — PROGRESS NOTES
Jean Claude WALSH Page  1940  Date of Office Visit: 8/23/18  Encounter Provider: James Riddle MD  Place of Service: [facility] CARDIOLOGY    Chief complaint  1. Permanent dual chamber pacemaker.  2. High grade AV block.  3. Syncope.  History of Present Illness  Dear Dr. Davey:  I had the pleasure of seeing your patient in followup today. The patient is a very pleasant 78-year-old gentleman with a medical history of what appears to be Mobitz II second degree heart block and perhaps even transient third degree AV block along with syncope. He underwent a Medtronic dual chamber pacemaker placement in January 2013 secondary to those findings. Since that time, he has been doing very well and had no issues with his pacemaker.     Since our last visit he has been doing very well.  He denies any chest pain or dyspnea on exertion.  He is tolerating his Pravachol well.  He last had laboratory work in 12/2017 which revealed an HDL of 82 and an LDL of 98.  He had no changes in his therapy at that time.                 Review of Systems   Constitution: Negative for fever, weakness and malaise/fatigue.   HENT: Negative for nosebleeds and sore throat.    Eyes: Negative for blurred vision and double vision.   Cardiovascular: Negative for chest pain, claudication, palpitations and syncope.   Respiratory: Negative for cough, shortness of breath and snoring.    Endocrine: Negative for cold intolerance, heat intolerance and polydipsia.   Skin: Negative for itching, poor wound healing and rash.   Musculoskeletal: Negative for joint pain, joint swelling, muscle weakness and myalgias.   Gastrointestinal: Negative for abdominal pain, melena, nausea and vomiting.   Neurological: Negative for light-headedness, loss of balance, seizures and vertigo.   Psychiatric/Behavioral: Negative for altered mental status and depression.          Past Medical History:   Diagnosis Date   • Acquired hypothyroidism  "5/24/2016   • Atypical chest pain 6/24/2016   • AV block    • Cancer (CMS/HCC) 8/19/10    Prostate surgery   • H/O echocardiogram 01/25/2013   • Health care maintenance    • History of EKG 08/13/2015   • Hyperlipidemia 5/24/2016   • Medicare annual wellness visit, subsequent 12/15/2017   • Mobitz type II block    • Pacemaker        The following portions of the patient's history were reviewed and updated as appropriate: Social history , Family history and Surgical history     Current Outpatient Prescriptions on File Prior to Visit   Medication Sig Dispense Refill   • ascorbic acid (VITAMIN C) 1000 MG tablet Take  by mouth daily.     • fexofenadine (ALLEGRA) 180 MG tablet Take 180 mg by mouth daily.     • fluticasone (FLONASE) 50 MCG/ACT nasal spray 2 sprays daily.     • levothyroxine (SYNTHROID, LEVOTHROID) 75 MCG tablet TAKE ONE TABLET BY MOUTH ONCE DAILY 90 tablet 1   • Multiple Vitamin tablet Take  by mouth.     • pravastatin (PRAVACHOL) 40 MG tablet TAKE ONE TABLET BY MOUTH ONCE DAILY 90 tablet 3     No current facility-administered medications on file prior to visit.        Allergies   Allergen Reactions   • Aspirin Anaphylaxis       Vitals:    08/23/18 1035   BP: 136/78   Pulse: 71   Weight: 68.5 kg (151 lb)   Height: 175.3 cm (69\")     Physical Exam   Constitutional: He is oriented to person, place, and time. He appears well-developed and well-nourished.   HENT:   Head: Normocephalic and atraumatic.   Eyes: Conjunctivae and EOM are normal. No scleral icterus.   Neck: Normal range of motion. Neck supple. Normal carotid pulses, no hepatojugular reflux and no JVD present. Carotid bruit is not present. No tracheal deviation present. No thyromegaly present.   Cardiovascular: Normal rate and regular rhythm.  Exam reveals no gallop and no friction rub.    No murmur heard.  Pulses:       Carotid pulses are 2+ on the right side, and 2+ on the left side.       Radial pulses are 2+ on the right side, and 2+ on the left " side.        Femoral pulses are 2+ on the right side, and 2+ on the left side.       Dorsalis pedis pulses are 2+ on the right side, and 2+ on the left side.        Posterior tibial pulses are 2+ on the right side, and 2+ on the left side.   Pulmonary/Chest: Breath sounds normal. No respiratory distress. He has no decreased breath sounds. He has no wheezes. He has no rhonchi. He has no rales. He exhibits no tenderness.   Left chest wall pacemaker.  No hematoma.   Abdominal: Soft. Bowel sounds are normal. He exhibits no distension. There is no tenderness. There is no rebound.   Musculoskeletal: He exhibits no edema or deformity.   Neurological: He is alert and oriented to person, place, and time. He has normal strength. No sensory deficit.   Skin: No rash noted. No erythema.        Psychiatric: He has a normal mood and affect. His behavior is normal.       No components found for: CBC  No results found for: CMP  No components found for: LIPID  No results found for: BMP      ECG 12 Lead  Date/Time: 8/23/2018 11:00 AM  Performed by: GABBY ZAYAS  Authorized by: GABBY ZAYAS   Comparison: compared with previous ECG from 8/22/2017  Rhythm: sinus rhythm  Rate: normal  QRS axis: normal  Clinical impression: non-specific ECG               6/22/16 Stress  IMPRESSION:   1. No scintigraphic evidence of Lexiscan-induced ischemia.   2. No scintigraphic evidence of prior myocardial infarction.   3. Preserved left ventricular ejection fraction at 78% with normal wall   motion and thickness.   4. Normal Lexiscan Cardiolite stress test.     TTE 6/22/16  Conclusions:  The left ventricular chamber size is normal.  Borderline concentric left ventricular hypertrophy.  The estimated ejection fraction is 60-65%.   There is an E to A reversal in the mitral valve flow pattern suggestive  of diastolic dysfunction.  The left atrium is mildly enlarged.  A pacemaker wire is visualized in the right ventricle.  There is mild  aortic regurgitation.   There is mild mitral regurgitation.   There is mild tricuspid regurgitation.  The right ventricular systolic pressure is estimated to be 35-40 mmHg.   There is evidence of mild pulmonary hypertension.    DISCUSSION/SUMMARY   78-year-old gentleman with a medical history as documented above including AV block, pacemaker placement as documented above, and recent evaluation for chest pain in 06/2016 who presents back for followup.  He is doing very well and is having no new issues.     1. Dual chamber Medtronic pacemaker placement secondary to AV block.  Continue pacemaker monitoring in our clinic.  Functioning appropriately.  No significant issues    2. Hyperlipidemia:     -12/15/17: HDL 82. LDL 98. Stable   -Continue Pravastatin 40 mg daily.

## 2018-11-29 ENCOUNTER — CLINICAL SUPPORT NO REQUIREMENTS (OUTPATIENT)
Dept: CARDIOLOGY | Facility: CLINIC | Age: 78
End: 2018-11-29

## 2018-11-29 DIAGNOSIS — I49.5 SICK SINUS SYNDROME (HCC): Primary | ICD-10-CM

## 2018-11-29 PROCEDURE — 93294 REM INTERROG EVL PM/LDLS PM: CPT | Performed by: INTERNAL MEDICINE

## 2018-11-29 PROCEDURE — 93296 REM INTERROG EVL PM/IDS: CPT | Performed by: INTERNAL MEDICINE

## 2018-12-06 DIAGNOSIS — E03.9 ACQUIRED HYPOTHYROIDISM: ICD-10-CM

## 2018-12-06 RX ORDER — LEVOTHYROXINE SODIUM 0.07 MG/1
TABLET ORAL
Qty: 90 TABLET | Refills: 1 | Status: SHIPPED | OUTPATIENT
Start: 2018-12-06 | End: 2019-05-30 | Stop reason: SDUPTHER

## 2018-12-10 DIAGNOSIS — R73.01 IMPAIRED FASTING GLUCOSE: ICD-10-CM

## 2018-12-10 DIAGNOSIS — Z12.5 SCREENING PSA (PROSTATE SPECIFIC ANTIGEN): ICD-10-CM

## 2018-12-10 DIAGNOSIS — E03.9 HYPOTHYROIDISM, UNSPECIFIED TYPE: ICD-10-CM

## 2018-12-10 DIAGNOSIS — R79.9 ABNORMAL FINDING OF BLOOD CHEMISTRY: ICD-10-CM

## 2018-12-10 DIAGNOSIS — Z00.00 MEDICARE ANNUAL WELLNESS VISIT, SUBSEQUENT: Primary | ICD-10-CM

## 2018-12-10 DIAGNOSIS — E53.8 VITAMIN B12 DEFICIENCY: ICD-10-CM

## 2018-12-11 ENCOUNTER — LAB (OUTPATIENT)
Dept: INTERNAL MEDICINE | Facility: CLINIC | Age: 78
End: 2018-12-11

## 2018-12-11 DIAGNOSIS — E03.9 HYPOTHYROIDISM, UNSPECIFIED TYPE: ICD-10-CM

## 2018-12-11 DIAGNOSIS — Z00.00 MEDICARE ANNUAL WELLNESS VISIT, SUBSEQUENT: ICD-10-CM

## 2018-12-11 DIAGNOSIS — R73.01 IMPAIRED FASTING GLUCOSE: ICD-10-CM

## 2018-12-11 DIAGNOSIS — Z12.5 SCREENING PSA (PROSTATE SPECIFIC ANTIGEN): ICD-10-CM

## 2018-12-11 DIAGNOSIS — E53.8 VITAMIN B12 DEFICIENCY: ICD-10-CM

## 2018-12-11 DIAGNOSIS — R79.9 ABNORMAL FINDING OF BLOOD CHEMISTRY: ICD-10-CM

## 2018-12-11 LAB
ALBUMIN SERPL-MCNC: 4.8 G/DL (ref 3.5–5.2)
ALBUMIN/GLOB SERPL: 2.5 G/DL
ALP SERPL-CCNC: 47 U/L (ref 40–129)
ALT SERPL-CCNC: 13 U/L (ref 5–41)
AST SERPL-CCNC: 24 U/L (ref 5–40)
BASOPHILS # BLD AUTO: 0.04 10*3/MM3 (ref 0–0.2)
BASOPHILS NFR BLD AUTO: 0.8 % (ref 0–2)
BILIRUB SERPL-MCNC: 0.8 MG/DL (ref 0.2–1.2)
BUN SERPL-MCNC: 16 MG/DL (ref 8–23)
BUN/CREAT SERPL: 17.6 (ref 7–25)
CALCIUM SERPL-MCNC: 9.6 MG/DL (ref 8.8–10.5)
CHLORIDE SERPL-SCNC: 100 MMOL/L (ref 98–107)
CHOLEST SERPL-MCNC: 197 MG/DL (ref 0–200)
CO2 SERPL-SCNC: 29.7 MMOL/L (ref 22–29)
CREAT SERPL-MCNC: 0.91 MG/DL (ref 0.76–1.27)
EOSINOPHIL # BLD AUTO: 0.37 10*3/MM3 (ref 0.1–0.3)
EOSINOPHIL NFR BLD AUTO: 7.6 % (ref 0–4)
ERYTHROCYTE [DISTWIDTH] IN BLOOD BY AUTOMATED COUNT: 12 % (ref 11.5–14.5)
GLOBULIN SER CALC-MCNC: 1.9 GM/DL
GLUCOSE SERPL-MCNC: 87 MG/DL (ref 65–99)
HBA1C MFR BLD: 5.1 % (ref 4.8–5.6)
HCT VFR BLD AUTO: 42.5 % (ref 42–52)
HDLC SERPL-MCNC: 100 MG/DL (ref 40–60)
HGB BLD-MCNC: 14.1 G/DL (ref 14–18)
IMM GRANULOCYTES # BLD: 0.01 10*3/MM3 (ref 0–0.03)
IMM GRANULOCYTES NFR BLD: 0.2 % (ref 0–0.5)
LDLC SERPL CALC-MCNC: 83 MG/DL (ref 0–100)
LDLC/HDLC SERPL: 0.83 {RATIO}
LYMPHOCYTES # BLD AUTO: 1.59 10*3/MM3 (ref 0.6–4.8)
LYMPHOCYTES NFR BLD AUTO: 32.8 % (ref 20–45)
MCH RBC QN AUTO: 33.9 PG (ref 27–31)
MCHC RBC AUTO-ENTMCNC: 33.2 G/DL (ref 31–37)
MCV RBC AUTO: 102.2 FL (ref 80–94)
MONOCYTES # BLD AUTO: 0.52 10*3/MM3 (ref 0–1)
MONOCYTES NFR BLD AUTO: 10.7 % (ref 3–8)
NEUTROPHILS # BLD AUTO: 2.32 10*3/MM3 (ref 1.5–8.3)
NEUTROPHILS NFR BLD AUTO: 47.9 % (ref 45–70)
NRBC BLD AUTO-RTO: 0 /100 WBC (ref 0–0)
PLATELET # BLD AUTO: 212 10*3/MM3 (ref 140–500)
POTASSIUM SERPL-SCNC: 4.7 MMOL/L (ref 3.5–5.2)
PROT SERPL-MCNC: 6.7 G/DL (ref 6–8.5)
PSA SERPL-MCNC: <0.014 NG/ML (ref 0–4)
RBC # BLD AUTO: 4.16 10*6/MM3 (ref 4.7–6.1)
SODIUM SERPL-SCNC: 143 MMOL/L (ref 136–145)
TRIGL SERPL-MCNC: 69 MG/DL (ref 0–150)
TSH SERPL DL<=0.005 MIU/L-ACNC: 1.51 MIU/ML (ref 0.27–4.2)
VIT B12 SERPL-MCNC: 590 PG/ML
VLDLC SERPL CALC-MCNC: 13.8 MG/DL (ref 8–32)
WBC # BLD AUTO: 4.85 10*3/MM3 (ref 4.8–10.8)

## 2018-12-18 ENCOUNTER — OFFICE VISIT (OUTPATIENT)
Dept: INTERNAL MEDICINE | Facility: CLINIC | Age: 78
End: 2018-12-18

## 2018-12-18 VITALS
BODY MASS INDEX: 22.99 KG/M2 | OXYGEN SATURATION: 98 % | WEIGHT: 155.2 LBS | SYSTOLIC BLOOD PRESSURE: 124 MMHG | HEIGHT: 69 IN | HEART RATE: 76 BPM | DIASTOLIC BLOOD PRESSURE: 74 MMHG | RESPIRATION RATE: 14 BRPM

## 2018-12-18 DIAGNOSIS — Z00.00 MEDICARE ANNUAL WELLNESS VISIT, SUBSEQUENT: ICD-10-CM

## 2018-12-18 DIAGNOSIS — E78.49 OTHER HYPERLIPIDEMIA: ICD-10-CM

## 2018-12-18 DIAGNOSIS — D75.89 MACROCYTOSIS WITHOUT ANEMIA: ICD-10-CM

## 2018-12-18 DIAGNOSIS — I65.21 CAROTID ARTERY PLAQUE, RIGHT: Primary | ICD-10-CM

## 2018-12-18 PROCEDURE — G0439 PPPS, SUBSEQ VISIT: HCPCS | Performed by: INTERNAL MEDICINE

## 2018-12-18 PROCEDURE — 99213 OFFICE O/P EST LOW 20 MIN: CPT | Performed by: INTERNAL MEDICINE

## 2018-12-18 NOTE — PATIENT INSTRUCTIONS
Medicare Wellness  Personal Prevention Plan of Service     Date of Office Visit:  2018  Encounter Provider:  Mahad Davey MD  Place of Service:  [facility] INTERNAL MED AND PEDS  Patient Name: Jean Claude Ramirez  :  1940    As part of the Medicare Wellness portion of your visit today, we are providing you with this personalized preventive plan of services (PPPS). This plan is based upon recommendations of the [place] Preventive Services Task Force (USPSTF) and the Advisory Committee on Immunization Practices (ACIP).    This lists the preventive care services that should be considered, and provides dates of when you are due. Items listed as completed are up-to-date and do not require any further intervention.    Health Maintenance   Topic Date Due   • HEPATITIS A VACCINE ADULT (1 of 2) 1958   • ZOSTER VACCINE (2 of 2) 02/15/2011   • PNEUMOCOCCAL VACCINES (65+ LOW/MEDIUM RISK) (2 of 2 - PPSV23) 2017   • LIPID PANEL  2019   • MEDICARE ANNUAL WELLNESS  2019   • TDAP/TD VACCINES (2 - Td) 10/03/2022   • INFLUENZA VACCINE  Completed   • COLOGUARD  Discontinued       Orders Placed This Encounter   Procedures   • US Carotid Bilateral     Standing Status:   Future     Standing Expiration Date:   2019     Scheduling Instructions:      [place]     Order Specific Question:   Reason for Exam:     Answer:   carotid plaque       Return in about 6 months (around 2019).

## 2018-12-18 NOTE — PROGRESS NOTES
QUICK REFERENCE INFORMATION:  The ABCs of the Annual Wellness Visit    Subsequent Medicare Wellness Visit    HEALTH RISK ASSESSMENT    1940    Recent Hospitalizations:  No hospitalization(s) within the last year..        Current Medical Providers:  Patient Care Team:  Mahad Davey MD as PCP - General  James Riddle MD as PCP - Claims Attributed        Smoking Status:  Social History     Tobacco Use   Smoking Status Never Smoker   Smokeless Tobacco Never Used       Alcohol Consumption:  Social History     Substance and Sexual Activity   Alcohol Use Yes    Comment: Social Drinker       Depression Screen:   PHQ-2/PHQ-9 Depression Screening 12/18/2018   Little interest or pleasure in doing things 0   Feeling down, depressed, or hopeless 0   Total Score 0       Health Habits and Functional and Cognitive Screening:  Functional & Cognitive Status 12/18/2018   Do you have difficulty preparing food and eating? No   Do you have difficulty bathing yourself, getting dressed or grooming yourself? No   Do you have difficulty using the toilet? No   Do you have difficulty moving around from place to place? No   Do you have trouble with steps or getting out of a bed or a chair? No   In the past year have you fallen or experienced a near fall? No   Current Diet Well Balanced Diet   Dental Exam Up to date   Eye Exam Up to date   Do you need help using the phone?  No   Are you deaf or do you have serious difficulty hearing?  No   Do you need help with transportation? No   Do you need help shopping? No   Do you need help preparing meals?  No   Do you need help with housework?  No   Do you need help with laundry? No   Do you need help taking your medications? No   Do you need help managing money? No   Do you ever drive or ride in a car without wearing a seat belt? No   Have you felt unusual stress, anger or loneliness in the last month? -   Who do you live with? -   If you need help, do you have trouble finding someone  available to you? -   Have you been bothered in the last four weeks by sexual problems? -   Do you have difficulty concentrating, remembering or making decisions? -           Does the patient have evidence of cognitive impairment? No    Aspirin use counseling: Taking ASA appropriately as indicated      Recent Lab Results:  CMP:  Lab Results   Component Value Date    GLU 87 12/11/2018    BUN 16 12/11/2018    CREATININE 0.91 12/11/2018    EGFRIFNONA 81 12/11/2018    EGFRIFAFRI 98 12/11/2018    BCR 17.6 12/11/2018     12/11/2018    K 4.7 12/11/2018    CO2 29.7 (H) 12/11/2018    CALCIUM 9.6 12/11/2018    PROTENTOTREF 6.7 12/11/2018    ALBUMIN 4.80 12/11/2018    LABGLOBREF 1.9 12/11/2018    LABIL2 2.5 12/11/2018    BILITOT 0.8 12/11/2018    ALKPHOS 47 12/11/2018    AST 24 12/11/2018    ALT 13 12/11/2018     Lipid Panel:  Lab Results   Component Value Date    TRIG 69 12/11/2018     (H) 12/11/2018    VLDL 13.8 12/11/2018    LDLHDL 0.83 12/11/2018     HbA1c:  Lab Results   Component Value Date    HGBA1C 5.10 12/11/2018       Visual Acuity:  No exam data present    Age-appropriate Screening Schedule:  Refer to the list below for future screening recommendations based on patient's age, sex and/or medical conditions. Orders for these recommended tests are listed in the plan section. The patient has been provided with a written plan.    Health Maintenance   Topic Date Due   • ZOSTER VACCINE (2 of 2) 02/15/2011   • PNEUMOCOCCAL VACCINES (65+ LOW/MEDIUM RISK) (2 of 2 - PPSV23) 04/13/2017   • LIPID PANEL  12/11/2019   • TDAP/TD VACCINES (2 - Td) 10/03/2022   • INFLUENZA VACCINE  Completed        Subjective   History of Present Illness    Jean Claude A Page is a 78 y.o. male who presents for an Subsequent Wellness Visit.    The following portions of the patient's history were reviewed and updated as appropriate: allergies, current medications, past family history, past medical history, past social history, past surgical  history and problem list.    Outpatient Medications Prior to Visit   Medication Sig Dispense Refill   • ascorbic acid (VITAMIN C) 1000 MG tablet Take  by mouth daily.     • fexofenadine (ALLEGRA) 180 MG tablet Take 180 mg by mouth daily.     • fluticasone (FLONASE) 50 MCG/ACT nasal spray 2 sprays daily.     • levothyroxine (SYNTHROID, LEVOTHROID) 75 MCG tablet TAKE 1 TABLET BY MOUTH ONCE DAILY 90 tablet 1   • Multiple Vitamin tablet Take  by mouth.     • PATIENT SUPPLIED ALLERGY INJECTION Inject  under the skin into the appropriate area as directed 1 (One) Time.     • pravastatin (PRAVACHOL) 40 MG tablet TAKE ONE TABLET BY MOUTH ONCE DAILY 90 tablet 3     No facility-administered medications prior to visit.        Patient Active Problem List   Diagnosis   • Acquired hypothyroidism   • Hyperlipidemia   • Syncope   • Atypical chest pain   • Cardiac pacemaker   • Medicare annual wellness visit, subsequent   • Carotid artery plaque, right   • Macrocytosis without anemia       Advance Care Planning:  wife POA    Identification of Risk Factors:  Risk factors include: none.    Review of Systems    Compared to one year ago, the patient feels his physical health is the same.  Compared to one year ago, the patient feels his mental health is the same.    Objective     Physical Exam   Constitutional: He is oriented to person, place, and time. He appears well-developed and well-nourished.   HENT:   Head: Normocephalic and atraumatic.   Right Ear: External ear normal.   Left Ear: External ear normal.   Nose: Nose normal.   Mouth/Throat: No oropharyngeal exudate.   Eyes: Conjunctivae and EOM are normal. Pupils are equal, round, and reactive to light.   Neck: Normal range of motion. Neck supple. No JVD present. No tracheal deviation present. No thyromegaly present.   Cardiovascular: Normal rate, regular rhythm, normal heart sounds and intact distal pulses. Exam reveals no gallop and no friction rub.   No murmur  "heard.  Pulmonary/Chest: Effort normal and breath sounds normal.   Abdominal: Soft. Bowel sounds are normal. He exhibits no distension and no mass. There is no tenderness. There is no rebound and no guarding. A hernia is present.   Reducible umbilical hernia   Musculoskeletal: Normal range of motion. He exhibits no edema.   Neurological: He is alert and oriented to person, place, and time. He has normal reflexes.   Skin: Skin is warm and dry.   Psychiatric: He has a normal mood and affect. His behavior is normal. Judgment and thought content normal.   Nursing note and vitals reviewed.      Vitals:    12/18/18 0829   BP: 124/74   Pulse: 76   Resp: 14   SpO2: 98%   Weight: 70.4 kg (155 lb 3.2 oz)   Height: 175.3 cm (69\")       Patient's Body mass index is 22.92 kg/m². BMI is within normal parameters. No follow-up required.      Assessment/Plan   Patient Self-Management and Personalized Health Advice  The patient has been provided with information about: mental health concerns and preventive services including:   · carotid us.    Visit Diagnoses:    ICD-10-CM ICD-9-CM   1. Carotid artery plaque, right I65.21 433.10   2. Other hyperlipidemia E78.49 272.4   3. Medicare annual wellness visit, subsequent Z00.00 V70.0   4. Macrocytosis without anemia D75.89 289.89       Orders Placed This Encounter   Procedures   • US Carotid Bilateral     Standing Status:   Future     Standing Expiration Date:   12/18/2019     Scheduling Instructions:      [place]     Order Specific Question:   Reason for Exam:     Answer:   carotid plaque       Outpatient Encounter Medications as of 12/18/2018   Medication Sig Dispense Refill   • ascorbic acid (VITAMIN C) 1000 MG tablet Take  by mouth daily.     • fexofenadine (ALLEGRA) 180 MG tablet Take 180 mg by mouth daily.     • fluticasone (FLONASE) 50 MCG/ACT nasal spray 2 sprays daily.     • levothyroxine (SYNTHROID, LEVOTHROID) 75 MCG tablet TAKE 1 TABLET BY MOUTH ONCE DAILY 90 tablet 1   • " Multiple Vitamin tablet Take  by mouth.     • PATIENT SUPPLIED ALLERGY INJECTION Inject  under the skin into the appropriate area as directed 1 (One) Time.     • pravastatin (PRAVACHOL) 40 MG tablet TAKE ONE TABLET BY MOUTH ONCE DAILY 90 tablet 3     No facility-administered encounter medications on file as of 12/18/2018.        Reviewed use of high risk medication in the elderly: yes  Reviewed for potential of harmful drug interactions in the elderly: yes    Follow Up:  Return in about 6 months (around 6/18/2019).     An After Visit Summary and PPPS with all of these plans were given to the patient.    Stay on B12, decrease alcohol to 2-3 per week if possible  Carotid stenosis reviewed

## 2018-12-21 ENCOUNTER — TRANSCRIBE ORDERS (OUTPATIENT)
Dept: ADMINISTRATIVE | Facility: HOSPITAL | Age: 78
End: 2018-12-21

## 2018-12-21 DIAGNOSIS — I65.21 CAROTID ARTERY PLAQUE, RIGHT: Primary | ICD-10-CM

## 2018-12-31 DIAGNOSIS — I65.21 OCCLUSION OF RIGHT CAROTID ARTERY: Primary | ICD-10-CM

## 2019-01-04 ENCOUNTER — APPOINTMENT (OUTPATIENT)
Dept: CARDIOLOGY | Facility: HOSPITAL | Age: 79
End: 2019-01-04

## 2019-01-23 ENCOUNTER — HOSPITAL ENCOUNTER (OUTPATIENT)
Dept: CARDIOLOGY | Facility: HOSPITAL | Age: 79
Discharge: HOME | End: 2019-01-23
Admitting: INTERNAL MEDICINE

## 2019-01-23 DIAGNOSIS — I65.21 OCCLUSION OF RIGHT CAROTID ARTERY: ICD-10-CM

## 2019-01-23 LAB
BH CV XLRA MEAS LEFT DIST CCA EDV: -14.9 CM/SEC
BH CV XLRA MEAS LEFT DIST CCA PSV: -72.2 CM/SEC
BH CV XLRA MEAS LEFT DIST ICA EDV: -19.8 CM/SEC
BH CV XLRA MEAS LEFT DIST ICA PSV: -76.4 CM/SEC
BH CV XLRA MEAS LEFT ICA/CCA RATIO: 1.1
BH CV XLRA MEAS LEFT MID ICA EDV: -16.6 CM/SEC
BH CV XLRA MEAS LEFT MID ICA PSV: -56.9 CM/SEC
BH CV XLRA MEAS LEFT PROX CCA EDV: 9.9 CM/SEC
BH CV XLRA MEAS LEFT PROX CCA PSV: 81.4 CM/SEC
BH CV XLRA MEAS LEFT PROX ECA EDV: -5 CM/SEC
BH CV XLRA MEAS LEFT PROX ECA PSV: -64.4 CM/SEC
BH CV XLRA MEAS LEFT PROX ICA EDV: -15.2 CM/SEC
BH CV XLRA MEAS LEFT PROX ICA PSV: -55.5 CM/SEC
BH CV XLRA MEAS LEFT PROX SCLA PSV: 167 CM/SEC
BH CV XLRA MEAS LEFT VERTEBRAL A EDV: 16.1 CM/SEC
BH CV XLRA MEAS LEFT VERTEBRAL A PSV: 75.5 CM/SEC
BH CV XLRA MEAS RIGHT CCA RATIO VEL: -77.8 CM/SEC
BH CV XLRA MEAS RIGHT DIST CCA EDV: -9.5 CM/SEC
BH CV XLRA MEAS RIGHT DIST CCA PSV: -77.8 CM/SEC
BH CV XLRA MEAS RIGHT DIST ICA EDV: -21.2 CM/SEC
BH CV XLRA MEAS RIGHT DIST ICA PSV: -80.6 CM/SEC
BH CV XLRA MEAS RIGHT ICA RATIO VEL: -80.6 CM/SEC
BH CV XLRA MEAS RIGHT ICA/CCA RATIO: 1
BH CV XLRA MEAS RIGHT MID ICA EDV: -21.8 CM/SEC
BH CV XLRA MEAS RIGHT MID ICA PSV: -80.6 CM/SEC
BH CV XLRA MEAS RIGHT PROX CCA EDV: 14.2 CM/SEC
BH CV XLRA MEAS RIGHT PROX CCA PSV: 213 CM/SEC
BH CV XLRA MEAS RIGHT PROX ECA EDV: -12.3 CM/SEC
BH CV XLRA MEAS RIGHT PROX ECA PSV: -81.6 CM/SEC
BH CV XLRA MEAS RIGHT PROX ICA EDV: -14.2 CM/SEC
BH CV XLRA MEAS RIGHT PROX ICA PSV: -71.1 CM/SEC
BH CV XLRA MEAS RIGHT PROX SCLA PSV: 312 CM/SEC
BH CV XLRA MEAS RIGHT VERTEBRAL A EDV: -4.3 CM/SEC
BH CV XLRA MEAS RIGHT VERTEBRAL A PSV: -41.7 CM/SEC
LEFT ARM BP: NORMAL MMHG
RIGHT ARM BP: NORMAL MMHG

## 2019-01-23 PROCEDURE — 93880 EXTRACRANIAL BILAT STUDY: CPT

## 2019-01-24 ENCOUNTER — TELEPHONE (OUTPATIENT)
Dept: INTERNAL MEDICINE | Facility: CLINIC | Age: 79
End: 2019-01-24

## 2019-01-24 NOTE — TELEPHONE ENCOUNTER
Patient has been advised and voiced understanding.     ----- Message from Mahad Davey MD sent at 1/24/2019  8:11 AM EST -----  Carotid us normal

## 2019-02-22 ENCOUNTER — CLINICAL SUPPORT NO REQUIREMENTS (OUTPATIENT)
Dept: CARDIOLOGY | Facility: CLINIC | Age: 79
End: 2019-02-22

## 2019-02-22 DIAGNOSIS — I49.5 SICK SINUS SYNDROME (HCC): Primary | ICD-10-CM

## 2019-02-22 PROCEDURE — 93280 PM DEVICE PROGR EVAL DUAL: CPT | Performed by: INTERNAL MEDICINE

## 2019-02-25 RX ORDER — PRAVASTATIN SODIUM 40 MG
TABLET ORAL
Qty: 90 TABLET | Refills: 3 | Status: SHIPPED | OUTPATIENT
Start: 2019-02-25

## 2019-05-30 DIAGNOSIS — E03.9 ACQUIRED HYPOTHYROIDISM: ICD-10-CM

## 2019-05-30 RX ORDER — LEVOTHYROXINE SODIUM 0.07 MG/1
75 TABLET ORAL DAILY
Qty: 90 TABLET | Refills: 0 | Status: SHIPPED | OUTPATIENT
Start: 2019-05-30 | End: 2021-09-15 | Stop reason: ALTCHOICE

## 2019-06-04 ENCOUNTER — CLINICAL SUPPORT NO REQUIREMENTS (OUTPATIENT)
Dept: CARDIOLOGY | Facility: CLINIC | Age: 79
End: 2019-06-04

## 2019-06-04 DIAGNOSIS — I49.5 SICK SINUS SYNDROME (HCC): Primary | ICD-10-CM

## 2019-06-04 PROCEDURE — 93294 REM INTERROG EVL PM/LDLS PM: CPT | Performed by: INTERNAL MEDICINE

## 2019-06-04 PROCEDURE — 93296 REM INTERROG EVL PM/IDS: CPT | Performed by: INTERNAL MEDICINE

## 2019-07-24 VITALS
RESPIRATION RATE: 20 BRPM | DIASTOLIC BLOOD PRESSURE: 79 MMHG | RESPIRATION RATE: 14 BRPM | SYSTOLIC BLOOD PRESSURE: 108 MMHG | DIASTOLIC BLOOD PRESSURE: 65 MMHG | SYSTOLIC BLOOD PRESSURE: 100 MMHG | DIASTOLIC BLOOD PRESSURE: 62 MMHG | DIASTOLIC BLOOD PRESSURE: 70 MMHG | RESPIRATION RATE: 19 BRPM | WEIGHT: 148 LBS | RESPIRATION RATE: 17 BRPM | HEIGHT: 69 IN | TEMPERATURE: 97.6 F | HEART RATE: 67 BPM | SYSTOLIC BLOOD PRESSURE: 113 MMHG | SYSTOLIC BLOOD PRESSURE: 107 MMHG | OXYGEN SATURATION: 98 % | RESPIRATION RATE: 24 BRPM | RESPIRATION RATE: 15 BRPM | DIASTOLIC BLOOD PRESSURE: 61 MMHG | DIASTOLIC BLOOD PRESSURE: 63 MMHG | SYSTOLIC BLOOD PRESSURE: 135 MMHG | OXYGEN SATURATION: 99 % | DIASTOLIC BLOOD PRESSURE: 54 MMHG | DIASTOLIC BLOOD PRESSURE: 50 MMHG | HEART RATE: 66 BPM | HEART RATE: 84 BPM | SYSTOLIC BLOOD PRESSURE: 147 MMHG | HEART RATE: 61 BPM | RESPIRATION RATE: 16 BRPM | HEART RATE: 60 BPM | SYSTOLIC BLOOD PRESSURE: 114 MMHG | SYSTOLIC BLOOD PRESSURE: 111 MMHG | DIASTOLIC BLOOD PRESSURE: 59 MMHG | HEART RATE: 64 BPM | TEMPERATURE: 97.4 F | OXYGEN SATURATION: 100 % | DIASTOLIC BLOOD PRESSURE: 64 MMHG | SYSTOLIC BLOOD PRESSURE: 102 MMHG | SYSTOLIC BLOOD PRESSURE: 116 MMHG | DIASTOLIC BLOOD PRESSURE: 56 MMHG | SYSTOLIC BLOOD PRESSURE: 125 MMHG | HEART RATE: 62 BPM

## 2019-07-29 ENCOUNTER — OFFICE (AMBULATORY)
Dept: URBAN - METROPOLITAN AREA PATHOLOGY 4 | Facility: PATHOLOGY | Age: 79
End: 2019-07-29
Payer: COMMERCIAL

## 2019-07-29 ENCOUNTER — AMBULATORY SURGICAL CENTER (AMBULATORY)
Dept: URBAN - METROPOLITAN AREA SURGERY 17 | Facility: SURGERY | Age: 79
End: 2019-07-29
Payer: COMMERCIAL

## 2019-07-29 DIAGNOSIS — D12.3 BENIGN NEOPLASM OF TRANSVERSE COLON: ICD-10-CM

## 2019-07-29 DIAGNOSIS — D12.2 BENIGN NEOPLASM OF ASCENDING COLON: ICD-10-CM

## 2019-07-29 DIAGNOSIS — D12.8 BENIGN NEOPLASM OF RECTUM: ICD-10-CM

## 2019-07-29 DIAGNOSIS — D12.4 BENIGN NEOPLASM OF DESCENDING COLON: ICD-10-CM

## 2019-07-29 DIAGNOSIS — Z86.010 PERSONAL HISTORY OF COLONIC POLYPS: ICD-10-CM

## 2019-07-29 DIAGNOSIS — K62.1 RECTAL POLYP: ICD-10-CM

## 2019-07-29 DIAGNOSIS — K57.30 DIVERTICULOSIS OF LARGE INTESTINE WITHOUT PERFORATION OR ABS: ICD-10-CM

## 2019-07-29 LAB
GI HISTOLOGY: A. UNSPECIFIED: (no result)
GI HISTOLOGY: B. UNSPECIFIED: (no result)
GI HISTOLOGY: C. UNSPECIFIED: (no result)
GI HISTOLOGY: D. UNSPECIFIED: (no result)
GI HISTOLOGY: E. UNSPECIFIED: (no result)
GI HISTOLOGY: F. UNSPECIFIED: (no result)
GI HISTOLOGY: PDF REPORT: (no result)

## 2019-07-29 PROCEDURE — 45385 COLONOSCOPY W/LESION REMOVAL: CPT | Mod: PT | Performed by: INTERNAL MEDICINE

## 2019-07-29 PROCEDURE — 45380 COLONOSCOPY AND BIOPSY: CPT | Mod: PT,59 | Performed by: INTERNAL MEDICINE

## 2019-07-29 PROCEDURE — 45380 COLONOSCOPY AND BIOPSY: CPT | Mod: 59,PT | Performed by: INTERNAL MEDICINE

## 2019-07-29 PROCEDURE — 88305 TISSUE EXAM BY PATHOLOGIST: CPT | Performed by: INTERNAL MEDICINE

## 2019-09-10 ENCOUNTER — CLINICAL SUPPORT NO REQUIREMENTS (OUTPATIENT)
Dept: CARDIOLOGY | Facility: CLINIC | Age: 79
End: 2019-09-10

## 2019-09-10 ENCOUNTER — OFFICE VISIT (OUTPATIENT)
Dept: CARDIOLOGY | Facility: CLINIC | Age: 79
End: 2019-09-10

## 2019-09-10 VITALS
DIASTOLIC BLOOD PRESSURE: 58 MMHG | OXYGEN SATURATION: 97 % | BODY MASS INDEX: 21.8 KG/M2 | SYSTOLIC BLOOD PRESSURE: 118 MMHG | WEIGHT: 147.2 LBS | RESPIRATION RATE: 18 BRPM | HEIGHT: 69 IN | HEART RATE: 77 BPM

## 2019-09-10 DIAGNOSIS — Z95.0 CARDIAC PACEMAKER: Primary | ICD-10-CM

## 2019-09-10 DIAGNOSIS — I49.5 SICK SINUS SYNDROME (HCC): Primary | ICD-10-CM

## 2019-09-10 DIAGNOSIS — E78.49 OTHER HYPERLIPIDEMIA: ICD-10-CM

## 2019-09-10 PROCEDURE — 93280 PM DEVICE PROGR EVAL DUAL: CPT | Performed by: INTERNAL MEDICINE

## 2019-09-10 PROCEDURE — 99213 OFFICE O/P EST LOW 20 MIN: CPT | Performed by: NURSE PRACTITIONER

## 2019-09-10 PROCEDURE — 93000 ELECTROCARDIOGRAM COMPLETE: CPT | Performed by: NURSE PRACTITIONER

## 2019-09-10 NOTE — PROGRESS NOTES
Date of Office Visit: 09/10/2019  Encounter Provider: SCOOTER Narvaez  Place of Service: [facility] CARDIOLOGY  Patient Name: Jean Claude Ramirez  :1940    Chief Complaint   Patient presents with   • SSS     1 YR FOLLOW UP   • Hyperlipidemia   :     HPI: Jean Claude Ramirez is a 79 y.o. male, new to me, who presents today for follow-up.  Old records have been obtained and reviewed by me.  He is a patient of Dr. Riddle's with a past medical history significant for hyperlipidemia, Mobitz II second-degree heart block, and transient third-degree AV block along with syncope.  In , he underwent dual-chamber pacemaker placement.  He was last seen in the office by Dr. Riddle on 2018 at which time he was doing well with no complaints of angina or heart failure.  No changes were made to his medical regimen and he was advised to follow-up in 1 year.   Over the past year he has been doing well from a cardiac standpoint.  He denies any chest pain, shortness of air, palpitations, edema, dizziness, or syncope.  He recently went on a [place] cruise with his family.  He walks approximately 2 miles every day and is able to do so without any difficulty.    Past Medical History:   Diagnosis Date   • Acquired hypothyroidism 2016   • Atypical chest pain 2016   • AV block    • Cancer (CMS/HCC) 8/19/10    Prostate surgery   • H/O echocardiogram 2013   • Health care maintenance    • History of EKG 2015   • Hyperlipidemia 2016   • Medicare annual wellness visit, subsequent 12/15/2017   • Mobitz type II block    • Pacemaker        Past Surgical History:   Procedure Laterality Date   • CARDIAC PACEMAKER PLACEMENT     • PACEMAKER IMPLANTATION  2013   • PROSTATE SURGERY     • SHOULDER SURGERY Left        Social History     Socioeconomic History   • Marital status:      Spouse name: Not on file   • Number of children: Not on file   • Years of education: Not on file   •  Highest education level: Not on file   Tobacco Use   • Smoking status: Never Smoker   • Smokeless tobacco: Never Used   • Tobacco comment: CAFFEINE USE- 2 CUPS COFFEE DAILY   Substance and Sexual Activity   • Alcohol use: Yes     Comment: Social Drinker   • Drug use: No   • Sexual activity: No     Partners: Female       Family History   Problem Relation Age of Onset   • No Known Problems Mother    • Heart disease Father    • Cancer Brother 60        metastatic   • Parkinsonism Brother         [facility]       Review of Systems   Constitution: Negative for chills, fever and malaise/fatigue.   Cardiovascular: Negative for chest pain, dyspnea on exertion, leg swelling, near-syncope, orthopnea, palpitations, paroxysmal nocturnal dyspnea and syncope.   Respiratory: Negative for cough and shortness of breath.    Musculoskeletal: Negative for joint pain, joint swelling and myalgias.   Gastrointestinal: Negative for abdominal pain, diarrhea, melena, nausea and vomiting.   Genitourinary: Negative for frequency and hematuria.   Neurological: Negative for light-headedness, numbness, paresthesias and seizures.   Allergic/Immunologic: Negative.    All other systems reviewed and are negative.      Allergies   Allergen Reactions   • Aspirin Anaphylaxis         Current Outpatient Medications:   •  ascorbic acid (VITAMIN C) 1000 MG tablet, Take  by mouth daily., Disp: , Rfl:   •  fluticasone (FLONASE) 50 MCG/ACT nasal spray, 2 sprays daily., Disp: , Rfl:   •  levothyroxine (SYNTHROID, LEVOTHROID) 75 MCG tablet, Take 1 tablet by mouth Daily., Disp: 90 tablet, Rfl: 0  •  Multiple Vitamin tablet, Take  by mouth., Disp: , Rfl:   •  pravastatin (PRAVACHOL) 40 MG tablet, TAKE 1 TABLET BY MOUTH ONCE DAILY, Disp: 90 tablet, Rfl: 3  •  fexofenadine (ALLEGRA) 180 MG tablet, Take 180 mg by mouth daily., Disp: , Rfl:   •  PATIENT SUPPLIED ALLERGY INJECTION, Inject  under the skin into the appropriate area as directed 1 (One)  "Time., Disp: , Rfl:       Objective:     Vitals:    09/10/19 1130 09/10/19 1139   BP: 116/56 118/58   BP Location: Right arm Left arm   Patient Position: Sitting Sitting   Pulse: 77    Resp: 18    SpO2: 97%    Weight: 66.8 kg (147 lb 3.2 oz)    Height: 175.3 cm (69\")      Body mass index is 21.74 kg/m².    PHYSICAL EXAM:    Physical Exam   Constitutional: He is oriented to person, place, and time. He appears well-developed and well-nourished. No distress.   HENT:   Head: Normocephalic and atraumatic.   Eyes: Pupils are equal, round, and reactive to light.   Neck: No JVD present. No thyromegaly present.   Cardiovascular: Normal rate, regular rhythm, normal heart sounds and intact distal pulses.   No murmur heard.  Pulmonary/Chest: Effort normal and breath sounds normal. No respiratory distress.   Abdominal: Soft. Bowel sounds are normal. He exhibits no distension. There is no splenomegaly or hepatomegaly. There is no tenderness.   Musculoskeletal: Normal range of motion. He exhibits no edema.   Neurological: He is alert and oriented to person, place, and time.   Skin: Skin is warm and dry. He is not diaphoretic. No erythema.   Psychiatric: He has a normal mood and affect. His behavior is normal. Judgment normal.         ECG 12 Lead  Date/Time: 9/10/2019 11:52 AM  Performed by: Roxane Palacio APRN  Authorized by: Roxane Palacio APRN   Comparison: compared with previous ECG from 8/23/2018  Similar to previous ECG  Rhythm: sinus rhythm  Rate: normal  BPM: 70    Clinical impression: normal ECG  Comments: Indication: Permanent pacemaker  He does have some nonspecific ST T wave changes in lead V2.  I reviewed the EKG with Dr. Bryan.  The patient is completely asymptomatic.  I suspect this is just a poor tracing.              Assessment:       Diagnosis Plan   1. Cardiac pacemaker  ECG 12 Lead   2. Other hyperlipidemia       Orders Placed This Encounter   Procedures   • ECG 12 Lead     This order was created via " procedure documentation          Plan:       1.  Sick sinus syndrome status post permanent pacemaker.  He had his pacemaker interrogated today which revealed normal dual-chamber pacemaker function.  He is in sinus rhythm today.      I think he is doing well from a cardiac standpoint.  He denies any symptoms of angina or heart failure.  I am not going to make any changes to his medical regimen and he will follow-up with Dr. Riddle in 1 year or sooner if needed.      As always, it has been a pleasure to participate in your patient's care.      Sincerely,         SCOOTER Montelongo

## 2019-11-14 ENCOUNTER — TRANSCRIBE ORDERS (OUTPATIENT)
Dept: ADMINISTRATIVE | Facility: HOSPITAL | Age: 79
End: 2019-11-14

## 2019-11-14 DIAGNOSIS — M72.0 DUPUYTREN'S CONTRACTURE: Primary | ICD-10-CM

## 2019-11-15 PROBLEM — M72.0 DUPUYTREN'S CONTRACTURE: Status: ACTIVE | Noted: 2019-11-15

## 2019-12-05 ENCOUNTER — HOSPITAL ENCOUNTER (OUTPATIENT)
Dept: INFUSION THERAPY | Facility: HOSPITAL | Age: 79
Discharge: HOME | End: 2019-12-05
Admitting: PLASTIC SURGERY

## 2019-12-05 VITALS
HEART RATE: 80 BPM | RESPIRATION RATE: 16 BRPM | TEMPERATURE: 96.2 F | SYSTOLIC BLOOD PRESSURE: 149 MMHG | DIASTOLIC BLOOD PRESSURE: 70 MMHG | OXYGEN SATURATION: 98 %

## 2019-12-05 DIAGNOSIS — M72.0 DUPUYTREN'S CONTRACTURE: Primary | ICD-10-CM

## 2019-12-05 PROCEDURE — 25010000002 COLLAGENASE CLOSTRID HISTOLYT 0.9 MG RECONSTITUTED SOLUTION: Performed by: PLASTIC SURGERY

## 2019-12-05 RX ORDER — LANOLIN ALCOHOL/MO/W.PET/CERES
1000 CREAM (GRAM) TOPICAL DAILY
COMMUNITY

## 2019-12-05 RX ADMIN — COLLAGENASE CLOSTRIDIUM HISTOLYTICUM 0.58 MG: KIT at 15:43

## 2019-12-05 NOTE — PROGRESS NOTES
Pt tolerated injections with no issues.  Informational packet and follow-up appointment information given to patient that Dr. Maldonado's office prepared.  Pt verbalizes understanding.  Pt DC per ambulation with spouse @ [id number].

## 2019-12-17 ENCOUNTER — TELEPHONE (OUTPATIENT)
Dept: CARDIOLOGY | Facility: CLINIC | Age: 79
End: 2019-12-17

## 2019-12-17 ENCOUNTER — CLINICAL SUPPORT NO REQUIREMENTS (OUTPATIENT)
Dept: CARDIOLOGY | Facility: CLINIC | Age: 79
End: 2019-12-17

## 2019-12-17 DIAGNOSIS — I44.1 SECOND DEGREE HEART BLOCK: Primary | ICD-10-CM

## 2019-12-17 PROCEDURE — 93296 REM INTERROG EVL PM/IDS: CPT | Performed by: INTERNAL MEDICINE

## 2019-12-17 PROCEDURE — 93294 REM INTERROG EVL PM/LDLS PM: CPT | Performed by: INTERNAL MEDICINE

## 2019-12-17 NOTE — TELEPHONE ENCOUNTER
Pt called back, returning your call about his remote pacemaker check.  He can be reached at #[phone].    Meg

## 2020-01-08 ENCOUNTER — HOSPITAL ENCOUNTER (OUTPATIENT)
Dept: GENERAL RADIOLOGY | Facility: HOSPITAL | Age: 80
Discharge: HOME | End: 2020-01-08
Admitting: INTERNAL MEDICINE

## 2020-01-08 ENCOUNTER — TRANSCRIBE ORDERS (OUTPATIENT)
Dept: ADMINISTRATIVE | Facility: HOSPITAL | Age: 80
End: 2020-01-08

## 2020-01-08 DIAGNOSIS — R52 PAIN: ICD-10-CM

## 2020-01-08 DIAGNOSIS — R52 PAIN: Primary | ICD-10-CM

## 2020-01-08 PROCEDURE — 72100 X-RAY EXAM L-S SPINE 2/3 VWS: CPT

## 2020-03-24 ENCOUNTER — CLINICAL SUPPORT NO REQUIREMENTS (OUTPATIENT)
Dept: CARDIOLOGY | Facility: CLINIC | Age: 80
End: 2020-03-24

## 2020-03-24 DIAGNOSIS — I49.5 SICK SINUS SYNDROME (HCC): Primary | ICD-10-CM

## 2020-03-24 PROCEDURE — 93296 REM INTERROG EVL PM/IDS: CPT | Performed by: INTERNAL MEDICINE

## 2020-03-24 PROCEDURE — 93294 REM INTERROG EVL PM/LDLS PM: CPT | Performed by: INTERNAL MEDICINE

## 2020-09-16 ENCOUNTER — OFFICE VISIT (OUTPATIENT)
Dept: CARDIOLOGY | Facility: CLINIC | Age: 80
End: 2020-09-16

## 2020-09-16 ENCOUNTER — CLINICAL SUPPORT NO REQUIREMENTS (OUTPATIENT)
Dept: CARDIOLOGY | Facility: CLINIC | Age: 80
End: 2020-09-16

## 2020-09-16 VITALS
WEIGHT: 147 LBS | SYSTOLIC BLOOD PRESSURE: 130 MMHG | DIASTOLIC BLOOD PRESSURE: 72 MMHG | HEART RATE: 65 BPM | HEIGHT: 69 IN | BODY MASS INDEX: 21.77 KG/M2

## 2020-09-16 DIAGNOSIS — E78.49 OTHER HYPERLIPIDEMIA: ICD-10-CM

## 2020-09-16 DIAGNOSIS — Z95.0 CARDIAC PACEMAKER: Primary | ICD-10-CM

## 2020-09-16 DIAGNOSIS — I49.5 SICK SINUS SYNDROME (HCC): Primary | ICD-10-CM

## 2020-09-16 PROCEDURE — 93280 PM DEVICE PROGR EVAL DUAL: CPT | Performed by: INTERNAL MEDICINE

## 2020-09-16 PROCEDURE — 93000 ELECTROCARDIOGRAM COMPLETE: CPT | Performed by: INTERNAL MEDICINE

## 2020-09-16 PROCEDURE — 99213 OFFICE O/P EST LOW 20 MIN: CPT | Performed by: INTERNAL MEDICINE

## 2020-09-16 NOTE — PROGRESS NOTES
Date of Office Visit: 20  Encounter Provider: James Riddle MD  Place of Service: [facility] CARDIOLOGY  Patient Name: Jean Claude Ramirez  :1940    Chief Complaint   Patient presents with   • Follow-up     1 year   • Sick Sinus Syndrome   • Pacemaker   :     HPI: Jean Claude Ramirez is a 80 y.o. male, new to me, who presents today for follow-up.  Old records have been obtained and reviewed by me.  He is a patient of mine with a past medical history significant for hyperlipidemia, Mobitz II second-degree heart block, and transient third-degree AV block along with syncope.  In , he underwent dual-chamber pacemaker placement.  Since her last visit he has been doing very well.  He denies any chest pain or dyspnea.  No orthopnea or PND.  His device is functioning well.    Past Medical History:   Diagnosis Date   • Acquired hypothyroidism 2016   • Atypical chest pain 2016   • AV block    • Cancer (CMS/[facility]) 8/19/10    Prostate surgery   • H/O echocardiogram 2013   • Health care maintenance    • History of EKG 2015   • Hyperlipidemia 2016   • Medicare annual wellness visit, subsequent 12/15/2017   • Mobitz type II block    • Pacemaker        Past Surgical History:   Procedure Laterality Date   • CARDIAC PACEMAKER PLACEMENT     • PACEMAKER IMPLANTATION  2013   • PROSTATE SURGERY     • PROSTHODONTIC PROCEDURE     • SHOULDER SURGERY Left        Social History     Socioeconomic History   • Marital status:      Spouse name: Not on file   • Number of children: Not on file   • Years of education: Not on file   • Highest education level: Not on file   Tobacco Use   • Smoking status: Never Smoker   • Smokeless tobacco: Never Used   • Tobacco comment: CAFFEINE USE- 2 CUPS COFFEE DAILY   Substance and Sexual Activity   • Alcohol use: Yes     Comment: Social Drinker   • Drug use: No   • Sexual activity: Never     Partners: Female       Family History    Problem Relation Age of Onset   • No Known Problems Mother    • Heart disease Father    • Cancer Brother 60        metastatic   • Parkinsonism Brother         [facility]       Review of Systems   Constitution: Negative for chills, fever and malaise/fatigue.   Cardiovascular: Negative for chest pain, dyspnea on exertion, leg swelling, near-syncope, orthopnea, palpitations, paroxysmal nocturnal dyspnea and syncope.   Respiratory: Negative for cough and shortness of breath.    Musculoskeletal: Negative for joint pain, joint swelling and myalgias.   Gastrointestinal: Negative for abdominal pain, diarrhea, melena, nausea and vomiting.   Genitourinary: Negative for frequency and hematuria.   Neurological: Negative for light-headedness, numbness, paresthesias and seizures.   Allergic/Immunologic: Negative.    All other systems reviewed and are negative.      Allergies   Allergen Reactions   • Aspirin Anaphylaxis         Current Outpatient Medications:   •  ascorbic acid (VITAMIN C) 1000 MG tablet, Take  by mouth daily., Disp: , Rfl:   •  Cyanocobalamin (B-12 COMPLIANCE INJECTION) 1000 MCG/ML kit, Inject 1 syringe as directed Every 30 (Thirty) Days., Disp: , Rfl:   •  fexofenadine (ALLEGRA) 180 MG tablet, Take 180 mg by mouth daily., Disp: , Rfl:   •  fluticasone (FLONASE) 50 MCG/ACT nasal spray, 2 sprays daily., Disp: , Rfl:   •  levothyroxine (SYNTHROID, LEVOTHROID) 75 MCG tablet, Take 1 tablet by mouth Daily. (Patient taking differently: Take 50 mcg by mouth Daily.), Disp: 90 tablet, Rfl: 0  •  Multiple Vitamin tablet, Take  by mouth., Disp: , Rfl:   •  PATIENT SUPPLIED ALLERGY INJECTION, Inject  under the skin into the appropriate area as directed 1 (One) Time., Disp: , Rfl:   •  pravastatin (PRAVACHOL) 40 MG tablet, TAKE 1 TABLET BY MOUTH ONCE DAILY, Disp: 90 tablet, Rfl: 3  •  vitamin B-12 (CYANOCOBALAMIN) 1000 MCG tablet, Take 1,000 mcg by mouth Daily., Disp: , Rfl:       Objective:     Vitals:     "09/16/20 1114   BP: 130/72   Pulse: 65   Weight: 66.7 kg (147 lb)   Height: 175.3 cm (69\")     Body mass index is 21.71 kg/m².    PHYSICAL EXAM:    Physical Exam   Constitutional: He is oriented to person, place, and time. He appears well-developed and well-nourished. No distress.   HENT:   Head: Normocephalic and atraumatic.   Eyes: Pupils are equal, round, and reactive to light.   Neck: No JVD present. No thyromegaly present.   Cardiovascular: Normal rate, regular rhythm, normal heart sounds and intact distal pulses.   No murmur heard.  Pulmonary/Chest: Effort normal and breath sounds normal. No respiratory distress.   Abdominal: Soft. Bowel sounds are normal. He exhibits no distension. There is no splenomegaly or hepatomegaly. There is no abdominal tenderness.   Musculoskeletal: Normal range of motion.         General: No edema.   Neurological: He is alert and oriented to person, place, and time.   Skin: Skin is warm and dry. He is not diaphoretic. No erythema.   Psychiatric: He has a normal mood and affect. His behavior is normal. Judgment normal.         ECG 12 Lead    Date/Time: 9/16/2020 11:32 AM  Performed by: James Riddle MD  Authorized by: James Riddle MD   Comparison: compared with previous ECG from 9/10/2019  Similar to previous ECG  Rhythm: sinus rhythm  Rate: normal  QRS axis: normal    Clinical impression: normal ECG              Assessment:      No diagnosis found.  No orders of the defined types were placed in this encounter.         Plan:       1.  Sick sinus syndrome status post permanent pacemaker.  He had his pacemaker interrogated today which revealed normal dual-chamber pacemaker function.  He is in sinus rhythm today.    2.  Hyperlipidemia: Continue pravastatin therapy at current dose.  No changes indicated.      I will plan on seeing him back in 1 year or earlier with problems.    "

## 2021-01-14 DIAGNOSIS — Z23 NEED FOR VACCINATION: ICD-10-CM

## 2021-03-09 DIAGNOSIS — Z23 IMMUNIZATION DUE: ICD-10-CM

## 2021-03-30 PROCEDURE — 93296 REM INTERROG EVL PM/IDS: CPT | Performed by: INTERNAL MEDICINE

## 2021-03-30 PROCEDURE — 93294 REM INTERROG EVL PM/LDLS PM: CPT | Performed by: INTERNAL MEDICINE

## 2021-09-15 ENCOUNTER — OFFICE VISIT (OUTPATIENT)
Dept: CARDIOLOGY | Facility: CLINIC | Age: 81
End: 2021-09-15

## 2021-09-15 ENCOUNTER — CLINICAL SUPPORT NO REQUIREMENTS (OUTPATIENT)
Dept: CARDIOLOGY | Facility: CLINIC | Age: 81
End: 2021-09-15

## 2021-09-15 VITALS
HEIGHT: 69 IN | DIASTOLIC BLOOD PRESSURE: 60 MMHG | BODY MASS INDEX: 21.18 KG/M2 | WEIGHT: 143 LBS | SYSTOLIC BLOOD PRESSURE: 122 MMHG | HEART RATE: 62 BPM

## 2021-09-15 DIAGNOSIS — Z95.0 CARDIAC PACEMAKER: ICD-10-CM

## 2021-09-15 DIAGNOSIS — I49.5 SICK SINUS SYNDROME (HCC): Primary | ICD-10-CM

## 2021-09-15 DIAGNOSIS — E78.49 OTHER HYPERLIPIDEMIA: ICD-10-CM

## 2021-09-15 PROCEDURE — 93000 ELECTROCARDIOGRAM COMPLETE: CPT | Performed by: INTERNAL MEDICINE

## 2021-09-15 PROCEDURE — 99214 OFFICE O/P EST MOD 30 MIN: CPT | Performed by: INTERNAL MEDICINE

## 2021-09-15 PROCEDURE — 93280 PM DEVICE PROGR EVAL DUAL: CPT | Performed by: INTERNAL MEDICINE

## 2021-09-15 RX ORDER — AZELASTINE HYDROCHLORIDE 137 UG/1
SPRAY, METERED NASAL
COMMUNITY
Start: 2021-08-19

## 2021-09-15 RX ORDER — DONEPEZIL HYDROCHLORIDE 10 MG/1
1 TABLET, FILM COATED ORAL DAILY
COMMUNITY
Start: 2021-08-14

## 2021-09-15 RX ORDER — LEVOTHYROXINE SODIUM 0.05 MG/1
50 TABLET ORAL DAILY
COMMUNITY

## 2021-09-15 RX ORDER — TAMSULOSIN HYDROCHLORIDE 0.4 MG/1
1 CAPSULE ORAL DAILY
COMMUNITY
Start: 2021-08-31

## 2021-09-15 NOTE — PROGRESS NOTES
Date of Office Visit: 09/15/21  Encounter Provider: James Riddle MD  Place of Service: [facility] CARDIOLOGY  Patient Name: Jean Claude Ramirez  :1940    Chief Complaint   Patient presents with   • Follow-up     1 year   • Sick Sinus Syndrome   :     HPI: Jean Claude Ramirez is a 81 y.o. male who presents today for follow-up.  Old records have been obtained and reviewed by me.  He is a patient of mine with a past medical history significant for hyperlipidemia, Mobitz II second-degree heart block, and transient third-degree AV block along with syncope.  In , he underwent dual-chamber pacemaker placement.     Since her last visit has been doing very well.  He continues to exercise and walks multiple miles a week with no limitation.  He denies any dyspnea or chest pain.  His pacemaker interrogation has been reviewed and it is functioning well.  He had 3 very short atrial runs but no significant abnormalities otherwise.    Past Medical History:   Diagnosis Date   • Acquired hypothyroidism 2016   • Atypical chest pain 2016   • AV block    • Cancer (CMS/[facility]) 8/19/10    Prostate surgery   • H/O echocardiogram 2013   • Health care maintenance    • History of EKG 2015   • Hyperlipidemia 2016   • Medicare annual wellness visit, subsequent 12/15/2017   • Mobitz type II block    • Pacemaker        Past Surgical History:   Procedure Laterality Date   • CARDIAC PACEMAKER PLACEMENT     • PACEMAKER IMPLANTATION  2013   • PROSTATE SURGERY     • PROSTHODONTIC PROCEDURE     • SHOULDER SURGERY Left        Social History     Socioeconomic History   • Marital status:      Spouse name: Not on file   • Number of children: Not on file   • Years of education: Not on file   • Highest education level: Not on file   Tobacco Use   • Smoking status: Never Smoker   • Smokeless tobacco: Never Used   • Tobacco comment: CAFFEINE USE- 2 CUPS COFFEE DAILY   Substance and  Sexual Activity   • Alcohol use: Yes     Comment: Social Drinker   • Drug use: No   • Sexual activity: Never     Partners: Female       Family History   Problem Relation Age of Onset   • No Known Problems Mother    • Heart disease Father    • Cancer Brother 60        metastatic   • Parkinsonism Brother         [facility]       Review of Systems   Constitutional: Negative for chills, fever and malaise/fatigue.   Cardiovascular: Negative for chest pain, dyspnea on exertion, leg swelling, near-syncope, orthopnea, palpitations, paroxysmal nocturnal dyspnea and syncope.   Respiratory: Negative for cough and shortness of breath.    Musculoskeletal: Negative for joint pain, joint swelling and myalgias.   Gastrointestinal: Negative for abdominal pain, diarrhea, melena, nausea and vomiting.   Genitourinary: Negative for frequency and hematuria.   Neurological: Negative for light-headedness, numbness, paresthesias and seizures.   Allergic/Immunologic: Negative.    All other systems reviewed and are negative.      Allergies   Allergen Reactions   • Aspirin Anaphylaxis         Current Outpatient Medications:   •  ascorbic acid (VITAMIN C) 1000 MG tablet, Take  by mouth daily., Disp: , Rfl:   •  Cyanocobalamin (B-12 COMPLIANCE INJECTION) 1000 MCG/ML kit, Inject 1 syringe as directed Every 30 (Thirty) Days., Disp: , Rfl:   •  fexofenadine (ALLEGRA) 180 MG tablet, Take 180 mg by mouth daily., Disp: , Rfl:   •  fluticasone (FLONASE) 50 MCG/ACT nasal spray, 2 sprays daily., Disp: , Rfl:   •  levothyroxine (SYNTHROID, LEVOTHROID) 75 MCG tablet, Take 1 tablet by mouth Daily. (Patient taking differently: Take 50 mcg by mouth Daily.), Disp: 90 tablet, Rfl: 0  •  Multiple Vitamin tablet, Take  by mouth., Disp: , Rfl:   •  PATIENT SUPPLIED ALLERGY INJECTION, Inject  under the skin into the appropriate area as directed 1 (One) Time., Disp: , Rfl:   •  pravastatin (PRAVACHOL) 40 MG tablet, TAKE 1 TABLET BY MOUTH ONCE DAILY,  "Disp: 90 tablet, Rfl: 3  •  vitamin B-12 (CYANOCOBALAMIN) 1000 MCG tablet, Take 1,000 mcg by mouth Daily., Disp: , Rfl:       Objective:     Vitals:    09/15/21 1307   Height: 175.3 cm (69\")     Body mass index is 21.71 kg/m².    PHYSICAL EXAM:    Physical Exam  Constitutional:       General: He is not in acute distress.     Appearance: He is well-developed. He is not diaphoretic.   HENT:      Head: Normocephalic and atraumatic.   Eyes:      General: No scleral icterus.     Conjunctiva/sclera: Conjunctivae normal.      Pupils: Pupils are equal, round, and reactive to light.   Neck:      Thyroid: No thyromegaly.      Vascular: Normal carotid pulses. No carotid bruit, hepatojugular reflux or JVD.      Trachea: No tracheal deviation.   Cardiovascular:      Rate and Rhythm: Normal rate and regular rhythm.      Pulses: Intact distal pulses.           Carotid pulses are 2+ on the right side and 2+ on the left side.       Radial pulses are 2+ on the right side and 2+ on the left side.        Femoral pulses are 2+ on the right side and 2+ on the left side.       Dorsalis pedis pulses are 2+ on the right side and 2+ on the left side.        Posterior tibial pulses are 2+ on the right side and 2+ on the left side.      Heart sounds: Normal heart sounds. No murmur heard.   No friction rub. No gallop.    Pulmonary:      Effort: Pulmonary effort is normal. No respiratory distress.      Breath sounds: Normal breath sounds. No decreased breath sounds, wheezing, rhonchi or rales.   Chest:      Chest wall: No tenderness.   Abdominal:      General: Bowel sounds are normal. There is no distension.      Palpations: Abdomen is soft. There is no hepatomegaly or splenomegaly.      Tenderness: There is no abdominal tenderness. There is no rebound.   Musculoskeletal:         General: No deformity. Normal range of motion.      Cervical back: Normal range of motion and neck supple.   Skin:     General: Skin is warm and dry.      Findings: No " erythema or rash.   Neurological:      Mental Status: He is alert and oriented to person, place, and time.      Sensory: No sensory deficit.   Psychiatric:         Behavior: Behavior normal.         Judgment: Judgment normal.           ECG 12 Lead    Date/Time: 9/15/2021 1:37 PM  Performed by: James Riddle MD  Authorized by: James Riddle MD   Comparison: compared with previous ECG from 9/15/2021  Rhythm: sinus rhythm  Rate: normal  QRS axis: normal                Assessment:      No diagnosis found.  No orders of the defined types were placed in this encounter.         Plan:       1.  Sick sinus syndrome status post permanent pacemaker.  He had his pacemaker interrogated today which revealed normal dual-chamber pacemaker function.  He is in sinus rhythm today.  -Continue to monitor per pacemaker clinic protocol.  We will see him back yearly.    2.  Hyperlipidemia: Continue pravastatin therapy at current dose.  No changes indicated.  Patient denies myalgias.    I will see him back in 1 year

## 2021-11-17 ENCOUNTER — TELEPHONE (OUTPATIENT)
Dept: CARDIOLOGY | Facility: CLINIC | Age: 81
End: 2021-11-17

## 2021-11-17 NOTE — TELEPHONE ENCOUNTER
Pt called. He said that he sent in a pacemaker transmission today and received a message from the pacemaker department.   He is returning your call. He can be reached at #[phone], if someone has not already reached him back earlier.    Thanks,  Meg

## 2021-11-18 NOTE — TELEPHONE ENCOUNTER
Thanks,   Spoke with pt to ensure he had next remote pacemaker transmission dates as follows:  2/6/22, 5/7/22, and 8/8/22. Pt verbalized understanding.

## 2022-02-16 PROCEDURE — 93294 REM INTERROG EVL PM/LDLS PM: CPT | Performed by: INTERNAL MEDICINE

## 2022-02-16 PROCEDURE — 93296 REM INTERROG EVL PM/IDS: CPT | Performed by: INTERNAL MEDICINE

## 2022-09-21 ENCOUNTER — CLINICAL SUPPORT NO REQUIREMENTS (OUTPATIENT)
Dept: CARDIOLOGY | Facility: CLINIC | Age: 82
End: 2022-09-21

## 2022-09-21 ENCOUNTER — OFFICE VISIT (OUTPATIENT)
Dept: CARDIOLOGY | Facility: CLINIC | Age: 82
End: 2022-09-21

## 2022-09-21 VITALS
WEIGHT: 145 LBS | DIASTOLIC BLOOD PRESSURE: 64 MMHG | SYSTOLIC BLOOD PRESSURE: 120 MMHG | HEART RATE: 65 BPM | HEIGHT: 69 IN | BODY MASS INDEX: 21.48 KG/M2

## 2022-09-21 DIAGNOSIS — I49.5 SICK SINUS SYNDROME: Primary | ICD-10-CM

## 2022-09-21 DIAGNOSIS — I44.30 AV BLOCK: Primary | ICD-10-CM

## 2022-09-21 DIAGNOSIS — E78.49 OTHER HYPERLIPIDEMIA: ICD-10-CM

## 2022-09-21 DIAGNOSIS — Z95.0 CARDIAC PACEMAKER: ICD-10-CM

## 2022-09-21 PROCEDURE — 93000 ELECTROCARDIOGRAM COMPLETE: CPT | Performed by: NURSE PRACTITIONER

## 2022-09-21 PROCEDURE — 99214 OFFICE O/P EST MOD 30 MIN: CPT | Performed by: NURSE PRACTITIONER

## 2022-09-21 PROCEDURE — 93280 PM DEVICE PROGR EVAL DUAL: CPT | Performed by: INTERNAL MEDICINE

## 2022-09-21 NOTE — PROGRESS NOTES
Date of Office Visit: 2022  Encounter Provider: SCOOTER Molina  Place of Service: [facility] CARDIOLOGY  Patient Name: Jean Claude Ramirez  :1940    Chief Complaint   Patient presents with   • Hyperlipidemia   :     HPI: Jean Claude Ramirez is a 82 y.o. male.  He is a patient of Dr. Riddle's for the management of hyperlipidemia and sick sinus syndrome status post permanent pacemaker.  He was last seen in the office by Dr. Riddle in 2021 at which time he was doing well.  No changes were made to his regimen, and he was advised to follow-up in 1 year.   He feels great.  He denies any chest pain, shortness of breath, palpitations, edema, dizziness, or syncope.    Past Medical History:   Diagnosis Date   • Acquired hypothyroidism 2016   • Atypical chest pain 2016   • AV block    • Cancer (HCC) 8/19/10    Prostate surgery   • H/O echocardiogram 2013   • Health care maintenance    • History of EKG 2015   • Hyperlipidemia 2016   • Medicare annual wellness visit, subsequent 12/15/2017   • Mobitz type II block    • Pacemaker        Past Surgical History:   Procedure Laterality Date   • CARDIAC PACEMAKER PLACEMENT     • PACEMAKER IMPLANTATION  2013   • PROSTATE SURGERY     • PROSTHODONTIC PROCEDURE     • SHOULDER SURGERY Left        Social History     Socioeconomic History   • Marital status:    Tobacco Use   • Smoking status: Never Smoker   • Smokeless tobacco: Never Used   • Tobacco comment: CAFFEINE USE- 2 CUPS COFFEE DAILY   Substance and Sexual Activity   • Alcohol use: Yes     Comment: Social Drinker   • Drug use: No   • Sexual activity: Never     Partners: Female       Family History   Problem Relation Age of Onset   • No Known Problems Mother    • Heart disease Father    • Cancer Brother 60        metastatic   • Parkinsonism Brother         surgeon - [place]       Review of Systems   Constitutional: Negative.   Cardiovascular:  "Negative.  Negative for chest pain, dyspnea on exertion, leg swelling, orthopnea, paroxysmal nocturnal dyspnea and syncope.   Respiratory: Negative.    Hematologic/Lymphatic: Negative for bleeding problem.   Musculoskeletal: Negative for falls.   Gastrointestinal: Negative for melena.   Neurological: Negative for dizziness and light-headedness.       Allergies   Allergen Reactions   • Aspirin Anaphylaxis         Current Outpatient Medications:   •  ascorbic acid (VITAMIN C) 1000 MG tablet, Take  by mouth daily., Disp: , Rfl:   •  Azelastine HCl 137 MCG/SPRAY solution, USE 1 TO 2 SPRAY(S) IN EACH NOSTRIL TWICE DAILY AS NEEDED FOR BREAKTHROUGH RHINITIS SYMPTOMS, Disp: , Rfl:   •  donepezil (ARICEPT) 10 MG tablet, Take 1 tablet by mouth Daily., Disp: , Rfl:   •  fluticasone (FLONASE) 50 MCG/ACT nasal spray, 2 sprays daily., Disp: , Rfl:   •  levothyroxine (SYNTHROID, LEVOTHROID) 50 MCG tablet, Take 50 mcg by mouth Daily., Disp: , Rfl:   •  Multiple Vitamin tablet, Take  by mouth., Disp: , Rfl:   •  PATIENT SUPPLIED ALLERGY INJECTION, Inject  under the skin into the appropriate area as directed 1 (One) Time., Disp: , Rfl:   •  pravastatin (PRAVACHOL) 40 MG tablet, TAKE 1 TABLET BY MOUTH ONCE DAILY, Disp: 90 tablet, Rfl: 3  •  tamsulosin (FLOMAX) 0.4 MG capsule 24 hr capsule, Take 1 capsule by mouth Daily., Disp: , Rfl:   •  vitamin B-12 (CYANOCOBALAMIN) 1000 MCG tablet, Take 1,000 mcg by mouth Daily., Disp: , Rfl:       Objective:     Vitals:    09/21/22 1330   BP: 120/64   Pulse: 65   Weight: 65.8 kg (145 lb)   Height: 175.3 cm (69\")     Body mass index is 21.41 kg/m².    PHYSICAL EXAM:    Neck:      Vascular: No JVD.   Pulmonary:      Effort: Pulmonary effort is normal.      Breath sounds: Normal breath sounds.   Cardiovascular:      Normal rate. Regular rhythm.      Murmurs: There is no murmur.      No gallop. No click. No rub.   Pulses:     Intact distal pulses.           ECG 12 Lead    Date/Time: 9/21/2022 1:39 " PM  Performed by: Roxane Palacio APRN  Authorized by: Roxane Palacio APRN   Comparison: compared with previous ECG from 9/15/2021  Similar to previous ECG  Rhythm: sinus rhythm and paced  Rate: normal  BPM: 65                Assessment:       Diagnosis Plan   1. AV block  ECG 12 Lead   2. Cardiac pacemaker     3. Other hyperlipidemia       Orders Placed This Encounter   Procedures   • ECG 12 Lead     This order was created via procedure documentation     Order Specific Question:   Release to patient     Answer:   Routine Release          Plan:       1.  AV block.  Status post permanent pacemaker.  Device check today demonstrated normal device function.      2.  Hyperlipidemia.  Continue pravastatin.      I think he is doing well.  I am not recommending any changes, and he will follow-up with Dr. Riddle in 1 year.      As always, it has been a pleasure to participate in your patient's care.      Sincerely,         SCOOTER Montelongo

## 2023-02-17 PROCEDURE — 93296 REM INTERROG EVL PM/IDS: CPT | Performed by: INTERNAL MEDICINE

## 2023-02-17 PROCEDURE — 93294 REM INTERROG EVL PM/LDLS PM: CPT | Performed by: INTERNAL MEDICINE

## 2023-06-02 ENCOUNTER — CLINICAL SUPPORT NO REQUIREMENTS (OUTPATIENT)
Dept: CARDIOLOGY | Facility: CLINIC | Age: 83
End: 2023-06-02

## 2023-06-02 ENCOUNTER — OFFICE VISIT (OUTPATIENT)
Dept: CARDIOLOGY | Facility: CLINIC | Age: 83
End: 2023-06-02

## 2023-06-02 VITALS
DIASTOLIC BLOOD PRESSURE: 74 MMHG | SYSTOLIC BLOOD PRESSURE: 124 MMHG | WEIGHT: 155.8 LBS | BODY MASS INDEX: 23.08 KG/M2 | HEART RATE: 81 BPM | HEIGHT: 69 IN

## 2023-06-02 DIAGNOSIS — I49.5 SICK SINUS SYNDROME: Primary | ICD-10-CM

## 2023-06-02 DIAGNOSIS — I48.0 PAROXYSMAL ATRIAL FIBRILLATION: ICD-10-CM

## 2023-06-02 DIAGNOSIS — I44.30 AV BLOCK: ICD-10-CM

## 2023-06-02 DIAGNOSIS — Z95.0 CARDIAC PACEMAKER: ICD-10-CM

## 2023-06-02 DIAGNOSIS — E78.49 OTHER HYPERLIPIDEMIA: Primary | ICD-10-CM

## 2023-06-02 PROCEDURE — 99214 OFFICE O/P EST MOD 30 MIN: CPT | Performed by: NURSE PRACTITIONER

## 2023-06-02 PROCEDURE — 93000 ELECTROCARDIOGRAM COMPLETE: CPT | Performed by: NURSE PRACTITIONER

## 2023-06-02 RX ORDER — APIXABAN 5 MG/1
1 TABLET, FILM COATED ORAL EVERY 12 HOURS SCHEDULED
COMMUNITY
Start: 2023-05-22 | End: 2023-06-02 | Stop reason: SDUPTHER

## 2023-06-02 RX ORDER — APIXABAN 5 MG/1
5 TABLET, FILM COATED ORAL EVERY 12 HOURS SCHEDULED
Qty: 180 TABLET | Refills: 3 | Status: SHIPPED | OUTPATIENT
Start: 2023-06-02

## 2023-06-02 NOTE — PROGRESS NOTES
Date of Office Visit: 2023  Encounter Provider: SCOOTER Hayes  Place of Service: [facility] CARDIOLOGY  Patient Name: Jean Claude Ramirez  :1940    No chief complaint on file.  : hospital follow up    HPI: Jean Claude Ramirez is a 82 y.o. male who is a patient of Dr. Riddle.  He is new to me today and presents after hospitalization for atrial fibrillation recently in [place].  He has a history of hyperlipidemia, sick sinus syndrome status post dual-chamber permanent pacemaker placement in .  He was last seen by  SCOOTER Montelongo on 2022.  At that time, he had no complaints.  Device check that day showed normal functioning device.    Remote download 2023 showed normal functioning device and no events.  Remote download 2023 shows normal functioning device, 3 high atrial rate episodes.  2 events show one-to-one AV conduction suggestive of SVT and one episode is suggestive of atrial fibrillation.  Longest episode was 6 minutes and 21 seconds in duration.  Remote download 2022 showed normal functioning device, 1 event suggestive of SVT and another suggestive of AT/AF, both events lasted 23 to 35 seconds long with an average V rate of 89 to 149 bpm    Mr. Ramirez and his wife had gone to [place] for his son's high school graduation.  That morning, he woke up stating that he did not feel well.  He felt like his heart was beating out of his chest.  He was seen in the immediate care center where the EKG showed atrial fibrillation.  He was given medication in his IV along with Lovenox injections.  Patient was also given 1 L of normal saline.  He was then sent to the emergency department of the accompanying hospital and they did not do anything more as his rate had become controlled. Patient was started on Eliquis 5 mg twice daily as well metoprolol tartrate 12-1/2 mg twice daily.     On today's office visit, patient is feeling well.  He has not had any further  episodes.  He denies chest pain, palpitations, shortness of air, lower extremity edema and lightheadedness. He continues to be in atrial fibrillation with a controlled ventricular rate on EKG.  His pacemaker was checked on today's office visit and shows that he has been in ongoing atrial fibrillation since May 21 with an average rate of 78.  Patient is intrinsically controlled.  His blood pressures have been controlled and he and his wife are monitoring them at home.  He is due to have a follow-up appointment with his PCP where she is following his vitamin B levels.    Previous testing and notes have been reviewed by me.   Past Medical History:   Diagnosis Date   • Acquired hypothyroidism 5/24/2016   • Atypical chest pain 6/24/2016   • AV block    • Cancer 8/19/10    Prostate surgery   • H/O echocardiogram 01/25/2013   • Health care maintenance    • History of EKG 08/13/2015   • Hyperlipidemia 5/24/2016   • Medicare annual wellness visit, subsequent 12/15/2017   • Mobitz type II block    • Pacemaker        Past Surgical History:   Procedure Laterality Date   • CARDIAC PACEMAKER PLACEMENT     • PACEMAKER IMPLANTATION  01/25/2013   • PROSTATE SURGERY     • PROSTHODONTIC PROCEDURE     • SHOULDER SURGERY Left        Social History     Socioeconomic History   • Marital status:    Tobacco Use   • Smoking status: Never   • Smokeless tobacco: Never   • Tobacco comments:     CAFFEINE USE- 2 CUPS COFFEE DAILY   Substance and Sexual Activity   • Alcohol use: Yes     Comment: Social Drinker   • Drug use: No   • Sexual activity: Never     Partners: Female       Family History   Problem Relation Age of Onset   • No Known Problems Mother    • Heart disease Father    • Cancer Brother 60        metastatic   • Parkinsonism Brother         surgeon - [place]       Review of Systems   Constitutional: Negative.   HENT: Negative.    Eyes: Negative.    Cardiovascular: Positive for irregular heartbeat.   Respiratory: Negative.  "   Endocrine: Negative.    Hematologic/Lymphatic:        Eliquis 5 mg twice daily   Skin: Negative.    Musculoskeletal: Negative.    Gastrointestinal: Negative.    Genitourinary: Negative.    Neurological: Negative.    Psychiatric/Behavioral: Negative.    Allergic/Immunologic: Negative.        Allergies   Allergen Reactions   • Aspirin Anaphylaxis         Current Outpatient Medications:   •  ascorbic acid (VITAMIN C) 1000 MG tablet, Take  by mouth daily., Disp: , Rfl:   •  Azelastine HCl 137 MCG/SPRAY solution, USE 1 TO 2 SPRAY(S) IN EACH NOSTRIL TWICE DAILY AS NEEDED FOR BREAKTHROUGH RHINITIS SYMPTOMS, Disp: , Rfl:   •  B Complex Vitamins (B COMPLEX 1 PO), , Disp: , Rfl:   •  donepezil (ARICEPT) 10 MG tablet, Take 1 tablet by mouth Daily., Disp: , Rfl:   •  Eliquis 5 MG tablet tablet, Take 1 tablet by mouth Every 12 (Twelve) Hours., Disp: 180 tablet, Rfl: 3  •  fluticasone (FLONASE) 50 MCG/ACT nasal spray, 2 sprays Daily., Disp: , Rfl:   •  levothyroxine (SYNTHROID, LEVOTHROID) 50 MCG tablet, Take 1 tablet by mouth Daily., Disp: , Rfl:   •  metoprolol tartrate (LOPRESSOR) 25 MG tablet, Take 0.5 tablets by mouth Every 12 (Twelve) Hours., Disp: 100 tablet, Rfl: 3  •  Multiple Vitamin tablet, Take  by mouth., Disp: , Rfl:   •  PATIENT SUPPLIED ALLERGY INJECTION, Inject  under the skin into the appropriate area as directed 1 (One) Time., Disp: , Rfl:   •  pravastatin (PRAVACHOL) 40 MG tablet, TAKE 1 TABLET BY MOUTH ONCE DAILY, Disp: 90 tablet, Rfl: 3  •  tamsulosin (FLOMAX) 0.4 MG capsule 24 hr capsule, Take 1 capsule by mouth Daily., Disp: , Rfl:       Objective:     Vitals:    06/02/23 1201   BP: 124/74   Pulse: 81   Weight: 70.7 kg (155 lb 12.8 oz)   Height: 175.3 cm (69.02\")     Body mass index is 23 kg/m².    PHYSICAL EXAM:    Constitutional:       Appearance: Healthy appearance. Not in distress.   Neck:      Vascular: No JVR. JVD normal.   Pulmonary:      Effort: Pulmonary effort is normal.      Breath sounds: " Normal breath sounds. No wheezing. No rhonchi. No rales.   Chest:      Chest wall: Not tender to palpatation.   Cardiovascular:      PMI at left midclavicular line. Normal rate. Irregularly irregular rhythm. Normal S1. Normal S2.      Murmurs: There is no murmur.      No gallop. No click. No rub.   Pulses:     Intact distal pulses.   Edema:     Peripheral edema absent.   Abdominal:      General: Bowel sounds are normal.      Palpations: Abdomen is soft.      Tenderness: There is no abdominal tenderness.   Musculoskeletal: Normal range of motion.         General: No tenderness. Skin:     General: Skin is warm and dry.   Neurological:      General: No focal deficit present.      Mental Status: Alert and oriented to person, place and time.           ECG 12 Lead    Date/Time: 6/2/2023 1:23 PM  Performed by: Michelle Jackson APRN  Authorized by: Michelle Jackson APRN   Comparison: compared with previous ECG from 9/21/2022  Rhythm: atrial fibrillation and paced  Rhythm comments: v paced complexes  BPM: 81                Assessment:       Diagnosis Plan   1. Other hyperlipidemia  ECG 12 Lead      2. AV block  ECG 12 Lead      3. Cardiac pacemaker  ECG 12 Lead      4. Paroxysmal atrial fibrillation  ECG 12 Lead        Orders Placed This Encounter   Procedures   • ECG 12 Lead     This order was created via procedure documentation     Order Specific Question:   Release to patient     Answer:   Routine Release          Plan:       1.  Sick sinus syndrome status post dual-chamber permanent pacemaker placement in 2013:  2.  Hyperlipidemia: Most recent lipid panel June 2022 in target range.  On statin  3.  Paroxysmal atrial fibrillation: Controlled rate.  Recently started on metoprolol tartrate 12.5 mg twice daily as well as anticoagulation with Eliquis 5 mg twice daily.  Patient has no signs or symptoms of bleeding.  I have advised that patient may take an extra metoprolol tablet if he has palpitations for an extended  amount of time.    Mr. Ramirez will send a remote download in 1 month.  He is scheduled to see Dr. Riddle and have another pacemaker check in August.  He will call sooner for any questions or concerns.           Your medication list          Accurate as of June 2, 2023  1:25 PM. If you have any questions, ask your nurse or doctor.            CONTINUE taking these medications      Instructions Last Dose Given Next Dose Due   ascorbic acid 1000 MG tablet  Commonly known as: VITAMIN C      Take  by mouth daily.       Azelastine HCl 137 MCG/SPRAY solution      USE 1 TO 2 SPRAY(S) IN EACH NOSTRIL TWICE DAILY AS NEEDED FOR BREAKTHROUGH RHINITIS SYMPTOMS       B COMPLEX 1 PO           donepezil 10 MG tablet  Commonly known as: ARICEPT      Take 1 tablet by mouth Daily.       Eliquis 5 MG tablet tablet  Generic drug: apixaban      Take 1 tablet by mouth Every 12 (Twelve) Hours.       fluticasone 50 MCG/ACT nasal spray  Commonly known as: FLONASE      2 sprays Daily.       levothyroxine 50 MCG tablet  Commonly known as: SYNTHROID, LEVOTHROID      Take 1 tablet by mouth Daily.       metoprolol tartrate 25 MG tablet  Commonly known as: LOPRESSOR      Take 0.5 tablets by mouth Every 12 (Twelve) Hours.       multivitamin tablet tablet  Commonly known as: THERAGRAN      Take  by mouth.       PATIENT SUPPLIED ALLERGY INJECTION      Inject  under the skin into the appropriate area as directed 1 (One) Time.       pravastatin 40 MG tablet  Commonly known as: PRAVACHOL      TAKE 1 TABLET BY MOUTH ONCE DAILY       tamsulosin 0.4 MG capsule 24 hr capsule  Commonly known as: FLOMAX      Take 1 capsule by mouth Daily.             Where to Get Your Medications      These medications were sent to [facility] [address] [place] [address] ([place]), [place] - [address] [facility] - [phone]  - [phone] [address] ([place]) [place] [address]    Phone: [phone]   · Eliquis 5 MG tablet tablet  · metoprolol tartrate 25 MG tablet            As always, it has been a pleasure to participate in your patient's care.      Sincerely,       SCOOTER Larios

## 2023-06-08 DIAGNOSIS — E78.2 MIXED HYPERLIPIDEMIA: ICD-10-CM

## 2023-06-08 DIAGNOSIS — M10.041 IDIOPATHIC GOUT OF RIGHT HAND, UNSPECIFIED CHRONICITY: ICD-10-CM

## 2023-06-08 DIAGNOSIS — E03.8 OTHER SPECIFIED HYPOTHYROIDISM: ICD-10-CM

## 2023-06-08 DIAGNOSIS — Z86.59 MENTAL STATUS CHANGE RESOLVED: Primary | ICD-10-CM

## 2023-07-31 ENCOUNTER — TELEPHONE (OUTPATIENT)
Dept: CARDIOLOGY | Facility: CLINIC | Age: 83
End: 2023-07-31

## 2023-07-31 NOTE — TELEPHONE ENCOUNTER
Called pt and left a message asking for a call back with more details and to discuss moving up his appt.    Meg

## 2023-07-31 NOTE — TELEPHONE ENCOUNTER
Caller: Diana Ramirez    Relationship to patient: Emergency Contact    Best call back number: [phone]     Chief complaint: AFIB    Type of visit: FU    Requested date: ASAP    Additional notes: PT HAS BEEN FEELING DIZZY AROUND AN HOUR AFTER TAKING HIS MEDICATIONS - HE STATES THAT HE FEELS OFF AND THEY ARE WANTING TO COME IN SOONER THAN THE SEPTEMBER APPT THEY HAVE WITH DR ZAYAS -  PT IS CURRENTLY IN AFIB AND TAKES METOPROLOL TARTRATE

## 2023-08-01 NOTE — TELEPHONE ENCOUNTER
Pt's wife called back and I tried calling back but the line wouldn't go through. I will call again first thing tomorrow.    Meg

## 2023-08-02 NOTE — TELEPHONE ENCOUNTER
I left a message to call back, but can you call them tomorrow and pass his vitals on to Dr. Riddle. He may just need a medication adjustment.    Thanks a bunch,    Meg

## 2023-08-03 ENCOUNTER — TELEPHONE (OUTPATIENT)
Dept: CARDIOLOGY | Facility: CLINIC | Age: 83
End: 2023-08-03
Payer: MEDICARE

## 2023-08-03 NOTE — TELEPHONE ENCOUNTER
Called s/w  Alejandra she states they called Dr. Davey there PCP and she has adjusted some medication. Going to see her today.   She will have Dr. Davey send us an update on Dinora

## 2023-08-07 ENCOUNTER — HOSPITAL ENCOUNTER (EMERGENCY)
Facility: HOSPITAL | Age: 83
Discharge: HOME | End: 2023-08-07
Attending: EMERGENCY MEDICINE | Admitting: EMERGENCY MEDICINE
Payer: MEDICARE

## 2023-08-07 VITALS
TEMPERATURE: 97.3 F | HEART RATE: 93 BPM | BODY MASS INDEX: 22.25 KG/M2 | RESPIRATION RATE: 16 BRPM | HEIGHT: 69 IN | WEIGHT: 150.2 LBS | DIASTOLIC BLOOD PRESSURE: 85 MMHG | SYSTOLIC BLOOD PRESSURE: 133 MMHG | OXYGEN SATURATION: 96 %

## 2023-08-07 DIAGNOSIS — K40.90 LEFT INGUINAL HERNIA: Primary | ICD-10-CM

## 2023-08-07 PROCEDURE — 99283 EMERGENCY DEPT VISIT LOW MDM: CPT

## 2023-08-07 NOTE — ED TRIAGE NOTES
Pt to ed from PCP    Pt states he has L inguinal hernia. Pt is having discomfort and it is more swollen than normal. PCP reports the hernia is strangulated.

## 2023-08-07 NOTE — ED PROVIDER NOTES
EMERGENCY DEPARTMENT ENCOUNTER    Room Number:  20/20  PCP: Mahad Davey MD  Historian: Patient      HPI:  Chief Complaint: Hernia  A complete HPI/ROS/PMH/PSH/SH/FH are unobtainable due to: Nothing  Context: Jean Claude Ramirez is a 83 y.o. male who presents to the ED c/o hernia in his left groin.  He reports that he has been noticing this for a while but it just became worse over the last couple of days.  He reports it is uncomfortable.  He denies associated nausea vomiting, abdominal distention.  His last bowel movement was this morning and was normal.  He went to his PCPs office this morning and was told that he needed to come to the ER due to a suspected incarcerated hernia.  He is on Eliquis due to history of atrial fibrillation and was told to hold this medication today.            PAST MEDICAL HISTORY  Active Ambulatory Problems     Diagnosis Date Noted    Acquired hypothyroidism 05/24/2016    Hyperlipidemia 05/24/2016    Syncope 06/24/2016    Atypical chest pain 06/24/2016    Cardiac pacemaker 08/04/2016    Medicare annual wellness visit, subsequent 12/15/2017    Carotid artery plaque, right 12/18/2018    Macrocytosis without anemia 12/18/2018    Dupuytren's contracture 11/15/2019    AV block      Resolved Ambulatory Problems     Diagnosis Date Noted    No Resolved Ambulatory Problems     Past Medical History:   Diagnosis Date    Cancer 8/19/10    H/O echocardiogram 01/25/2013    Health care maintenance     History of EKG 08/13/2015    Mobitz type II block     Pacemaker          PAST SURGICAL HISTORY  Past Surgical History:   Procedure Laterality Date    CARDIAC PACEMAKER PLACEMENT      PACEMAKER IMPLANTATION  01/25/2013    PROSTATE SURGERY      PROSTHODONTIC PROCEDURE      SHOULDER SURGERY Left          FAMILY HISTORY  Family History   Problem Relation Age of Onset    No Known Problems Mother     Heart disease Father     Cancer Brother 60        metastatic    Parkinsonism Brother         surgeon - [facility]  bradley         SOCIAL HISTORY  Social History     Socioeconomic History    Marital status:    Tobacco Use    Smoking status: Never    Smokeless tobacco: Never    Tobacco comments:     CAFFEINE USE- 2 CUPS COFFEE DAILY   Substance and Sexual Activity    Alcohol use: Yes     Comment: Social Drinker    Drug use: No    Sexual activity: Never     Partners: Female         ALLERGIES  Aspirin        REVIEW OF SYSTEMS  Review of Systems   Review of all 14 systems is negative other than stated in the HPI above.      PHYSICAL EXAM  ED Triage Vitals   Temp Heart Rate Resp BP SpO2   08/07/23 1210 08/07/23 1210 08/07/23 1210 08/07/23 1215 08/07/23 1210   97.3 øF (36.3 øC) 93 16 133/85 96 %      Temp src Heart Rate Source Patient Position BP Location FiO2 (%)   08/07/23 1210 08/07/23 1210 08/07/23 1215 08/07/23 1215 --   Tympanic Monitor Lying Right arm        Physical Exam      GENERAL: Awake and alert, no acute distress  HENT: nares patent  EYES: no scleral icterus, EOMI  CV: regular rhythm, normal rate  RESPIRATORY: normal effort  ABDOMEN: soft, nondistended, nontender throughout.  : Normal-appearing phallus and scrotum.  Left inguinal hernia is palpable, minimally tender.  Hernia was easily reduced with manual pressure with patient in supine position.  MUSCULOSKELETAL: no deformity  NEURO: alert, moves all extremities, follows commands  PSYCH:  calm, cooperative  SKIN: warm, dry    Vital signs and nursing notes reviewed.          LAB RESULTS  No results found for this or any previous visit (from the past 24 hour(s)).    Ordered the above labs and reviewed the results.        RADIOLOGY  No Radiology Exams Resulted Within Past 24 Hours    Ordered the above noted radiological studies. Reviewed by me in PACS.            PROCEDURES  Procedures            MEDICATIONS GIVEN IN ER  Medications - No data to display                MEDICAL DECISION MAKING, PROGRESS, and CONSULTS    All labs have been independently reviewed by  me.  All radiology studies have been reviewed by me and I have also reviewed the radiology report.   EKG's independently viewed and interpreted by me.  Discussion below represents my analysis of pertinent findings related to patient's condition, differential diagnosis, treatment plan and final disposition.      Orders placed during this visit:  Orders Placed This Encounter   Procedures    Ambulatory Referral to General Surgery (All Except [facility])           Differential diagnosis includes but is not limited to:    Inguinal hernia with incarceration or strangulation  Hydrocele  Inguinal lymphadenopathy        Independent interpretation of labs, radiology studies, and discussions with consultants:       Left inguinal hernia easily reduced with manual pressure.  Patient is not ill-appearing.  He has had no signs or symptoms of small bowel obstruction.  He is appropriate for discharge home with outpatient general surgery referral to discuss potential elective outpatient repair.  Return precautions were discussed in the interim, specifically should he develop recurrent hernia that he cannot reduce on his own at home, worsening pain, fever, abdominal distention, vomiting.  Patient instructed to resume all of his home medications including his Eliquis until further instruction by his surgeon.        DIAGNOSIS  Final diagnoses:   Left inguinal hernia         DISPOSITION  DISCHARGE    Patient discharged in stable condition.    Reviewed implications of results, diagnosis, meds, responsibility to follow up, warning signs and symptoms of possible worsening, potential complications and reasons to return to ER.    Patient/Family voiced understanding of above instructions.    Discussed plan for discharge, as there is no emergent indication for admission. Patient referred to primary care provider for BP management due to today's BP. Pt/family is agreeable and understands need for follow up and repeat testing.  Pt is aware that  discharge does not mean that nothing is wrong but it indicates no emergency is present that requires admission and they must continue care with follow-up as given below or physician of their choice.     FOLLOW-UP  Ernestine Houser MD  [address]  [address] [address] [phone]    Schedule an appointment as soon as possible for a visit            Medication List      No changes were made to your prescriptions during this visit.                   Latest Documented Vital Signs:  As of 13:06 EDT  BP- 133/85 HR- 93 Temp- 97.3 øF (36.3 øC) (Tympanic) O2 sat- 96%              --    Please note that portions of this were completed with a voice recognition program.       Note Disclaimer: At [facility], we believe that sharing information builds trust and better relationships. You are receiving this note because you are receiving care at [facility] or recently visited. It is possible you will see health information before a provider has talked with you about it. This kind of information can be easy to misunderstand. To help you fully understand what it means for your health, we urge you to discuss this note with your provider.             Ta Warner MD  08/07/23 3539

## 2023-08-07 NOTE — ED NOTES
Was seen pmd this am and sentot r/o left strangulated inguinal hernia.  He had an ache and swelling in his left groin

## 2023-08-09 ENCOUNTER — OFFICE VISIT (OUTPATIENT)
Dept: SURGERY | Facility: CLINIC | Age: 83
End: 2023-08-09
Payer: MEDICARE

## 2023-08-09 VITALS
BODY MASS INDEX: 22.22 KG/M2 | HEIGHT: 69 IN | DIASTOLIC BLOOD PRESSURE: 75 MMHG | SYSTOLIC BLOOD PRESSURE: 135 MMHG | WEIGHT: 150 LBS

## 2023-08-09 DIAGNOSIS — K40.90 LEFT INGUINAL HERNIA: Primary | ICD-10-CM

## 2023-08-09 PROCEDURE — 99203 OFFICE O/P NEW LOW 30 MIN: CPT | Performed by: SURGERY

## 2023-08-09 PROCEDURE — 1160F RVW MEDS BY RX/DR IN RCRD: CPT | Performed by: SURGERY

## 2023-08-09 PROCEDURE — 1159F MED LIST DOCD IN RCRD: CPT | Performed by: SURGERY

## 2023-08-09 RX ORDER — SODIUM CHLORIDE 0.9 % (FLUSH) 0.9 %
10 SYRINGE (ML) INJECTION EVERY 12 HOURS SCHEDULED
OUTPATIENT
Start: 2023-08-18

## 2023-08-09 RX ORDER — CEFAZOLIN SODIUM 2 G/100ML
2000 INJECTION, SOLUTION INTRAVENOUS ONCE
OUTPATIENT
Start: 2023-08-18 | End: 2023-08-09

## 2023-08-09 RX ORDER — CARVEDILOL 3.12 MG/1
2 TABLET ORAL EVERY 12 HOURS SCHEDULED
COMMUNITY
Start: 2023-08-01

## 2023-08-09 RX ORDER — SODIUM CHLORIDE 9 MG/ML
40 INJECTION, SOLUTION INTRAVENOUS AS NEEDED
OUTPATIENT
Start: 2023-08-18

## 2023-08-09 RX ORDER — SODIUM CHLORIDE 0.9 % (FLUSH) 0.9 %
10 SYRINGE (ML) INJECTION AS NEEDED
OUTPATIENT
Start: 2023-08-18

## 2023-08-09 NOTE — H&P (VIEW-ONLY)
Cc: Left groin pain    History of presenting illness:   This is a nice, reasonably healthy and active 83-year-old gentleman who states that over about the last 5 to 7 days he has had some increasing pain and swelling in the left groin.  This prompted him to visit his primary care doctor and he also had an ER visit a couple of days ago, was evaluated, found to have a reducible hernia and referred here for further care.  He continues to have pain and discomfort at the site.  When he lays flat he is able to get this to reduce and has some improvement in his symptoms.    Past Medical History: Mobitz type II heart block, hyperlipidemia, hypothyroidism, prostate cancer, atrial fibrillation    Past Surgical History: Pacemaker placement, shoulder surgery, prostatectomy (unclear if this was done open or robotically, scars do not clearly tell the story) patient also had what sounds like a stricture procedure for his urethra done on 2 occasions over the last 3 years.    Medications: Eliquis, carvedilol, Aricept, levothyroxine, Flonase, pravastatin, Flomax    Allergies: Aspirin caused anaphylaxis    Social History: He is a non-smoker, he is still able to be reasonably active    Family History: Negative for colon or rectal cancer    Review of Systems:  Constitutional: Denies fever, chills, change in weight  Neck: no swollen glands or dysphagia or odynophagia  Respiratory: negative for SOB, cough, hemoptysis or wheezing  Cardiovascular: negative for chest pain, palpitations or peripheral edema  Gastrointestinal: Denies nausea, rectal bleeding, change in bowel habits      Physical Exam:  BMI: 22.1  General: alert and oriented, appropriate, no acute distress  Eyes: No scleral icterus, extraocular movements are intact  Neck: Supple without lymphadenopathy or thyromegaly, trachea is in the midline  Respiratory: There is good bilateral chest expansion, no use of accessory muscles is noted  Cardiovascular: No jugular venous distention  or peripheral edema is seen  Gastrointestinal: Soft and benign, no clear surgical scars identifiable on the abdominal wall, no ventral hernia noted  Genitourinary: Confirmed positive left inguinal hernia, tender but reducible    Laboratory data: No recent relevant data    Imaging data: No recent relevant data      Assessment and plan:   -Initial symptomatic reducible left inguinal hernia  -History of prostate cancer status post prostatectomy, complicating above  -History of atrial fibrillation on Eliquis, complicating above  -Options discussed with patient.  I have recommended proceeding with open left inguinal hernia repair with mesh.  I think that his prior prostatectomy increases his risk of postoperative complications, as well as the need for postoperative anticoagulation.  We will plan to hold his Eliquis 3 days prior to his procedure and proceed with open inguinal hernia repair with mesh.  Risks, benefits and alternatives discussed, patient and family agreeable to proceed as outlined.    Risks associated with the procedure are noted to include, but not be limited to, bleeding, infection, injury to small or large intestine, major vascular structures, the bladder or ureters.  Possibility of postoperative inguinodynia, mesh migration or infection, hernia recurrence and seroma formation also discussed.      Dwaine David MD, FACS  General, Minimally Invasive and Endoscopic Surgery  [facility] [place], operated by [facility]    [address], [address]  [place], [place], [address]  P: [phone]  F: [phone]

## 2023-08-09 NOTE — LETTER
August 9, 2023     Mahad Davey MD  [address]  [facility] [address]  [place] [address]    Patient: Jean Claude Ramirez   YOB: 1940   Date of Visit: 8/9/2023     Dear Mahad Davey MD:       Thank you for referring Jean Claude Ramirez to me for evaluation. Below are the relevant portions of my assessment and plan of care.    If you have questions, please do not hesitate to call me. I look forward to following Jean Claude along with you.         Sincerely,        Dwaine David MD        CC: No Recipients    Dwaine David MD  08/09/23 1410  Sign when Signing Visit  Cc: Left groin pain    History of presenting illness:   This is a nice, reasonably healthy and active 83-year-old gentleman who states that over about the last 5 to 7 days he has had some increasing pain and swelling in the left groin.  This prompted him to visit his primary care doctor and he also had an ER visit a couple of days ago, was evaluated, found to have a reducible hernia and referred here for further care.  He continues to have pain and discomfort at the site.  When he lays flat he is able to get this to reduce and has some improvement in his symptoms.    Past Medical History: Mobitz type II heart block, hyperlipidemia, hypothyroidism, prostate cancer, atrial fibrillation    Past Surgical History: Pacemaker placement, shoulder surgery, prostatectomy (unclear if this was done open or robotically, scars do not clearly tell the story) patient also had what sounds like a stricture procedure for his urethra done on 2 occasions over the last 3 years.    Medications: Eliquis, carvedilol, Aricept, levothyroxine, Flonase, pravastatin, Flomax    Allergies: Aspirin caused anaphylaxis    Social History: He is a non-smoker, he is still able to be reasonably active    Family History: Negative for colon or rectal cancer    Review of Systems:  Constitutional: Denies fever, chills, change in weight  Neck: no swollen glands or dysphagia or  odynophagia  Respiratory: negative for SOB, cough, hemoptysis or wheezing  Cardiovascular: negative for chest pain, palpitations or peripheral edema  Gastrointestinal: Denies nausea, rectal bleeding, change in bowel habits      Physical Exam:  BMI: 22.1  General: alert and oriented, appropriate, no acute distress  Eyes: No scleral icterus, extraocular movements are intact  Neck: Supple without lymphadenopathy or thyromegaly, trachea is in the midline  Respiratory: There is good bilateral chest expansion, no use of accessory muscles is noted  Cardiovascular: No jugular venous distention or peripheral edema is seen  Gastrointestinal: Soft and benign, no clear surgical scars identifiable on the abdominal wall, no ventral hernia noted  Genitourinary: Confirmed positive left inguinal hernia, tender but reducible    Laboratory data: No recent relevant data    Imaging data: No recent relevant data      Assessment and plan:   -Initial symptomatic reducible left inguinal hernia  -History of prostate cancer status post prostatectomy, complicating above  -History of atrial fibrillation on Eliquis, complicating above  -Options discussed with patient.  I have recommended proceeding with open left inguinal hernia repair with mesh.  I think that his prior prostatectomy increases his risk of postoperative complications, as well as the need for postoperative anticoagulation.  We will plan to hold his Eliquis 3 days prior to his procedure and proceed with open inguinal hernia repair with mesh.  Risks, benefits and alternatives discussed, patient and family agreeable to proceed as outlined.    Risks associated with the procedure are noted to include, but not be limited to, bleeding, infection, injury to small or large intestine, major vascular structures, the bladder or ureters.  Possibility of postoperative inguinodynia, mesh migration or infection, hernia recurrence and seroma formation also discussed.      Dwaine David MD,  FACS  General, Minimally Invasive and Endoscopic Surgery  [facility]    [address], [address]  [place], [place], [address]  P: [phone]  F: [phone]

## 2023-08-09 NOTE — PROGRESS NOTES
Cc: Left groin pain    History of presenting illness:   This is a nice, reasonably healthy and active 83-year-old gentleman who states that over about the last 5 to 7 days he has had some increasing pain and swelling in the left groin.  This prompted him to visit his primary care doctor and he also had an ER visit a couple of days ago, was evaluated, found to have a reducible hernia and referred here for further care.  He continues to have pain and discomfort at the site.  When he lays flat he is able to get this to reduce and has some improvement in his symptoms.    Past Medical History: Mobitz type II heart block, hyperlipidemia, hypothyroidism, prostate cancer, atrial fibrillation    Past Surgical History: Pacemaker placement, shoulder surgery, prostatectomy (unclear if this was done open or robotically, scars do not clearly tell the story) patient also had what sounds like a stricture procedure for his urethra done on 2 occasions over the last 3 years.    Medications: Eliquis, carvedilol, Aricept, levothyroxine, Flonase, pravastatin, Flomax    Allergies: Aspirin caused anaphylaxis    Social History: He is a non-smoker, he is still able to be reasonably active    Family History: Negative for colon or rectal cancer    Review of Systems:  Constitutional: Denies fever, chills, change in weight  Neck: no swollen glands or dysphagia or odynophagia  Respiratory: negative for SOB, cough, hemoptysis or wheezing  Cardiovascular: negative for chest pain, palpitations or peripheral edema  Gastrointestinal: Denies nausea, rectal bleeding, change in bowel habits      Physical Exam:  BMI: 22.1  General: alert and oriented, appropriate, no acute distress  Eyes: No scleral icterus, extraocular movements are intact  Neck: Supple without lymphadenopathy or thyromegaly, trachea is in the midline  Respiratory: There is good bilateral chest expansion, no use of accessory muscles is noted  Cardiovascular: No jugular venous distention  or peripheral edema is seen  Gastrointestinal: Soft and benign, no clear surgical scars identifiable on the abdominal wall, no ventral hernia noted  Genitourinary: Confirmed positive left inguinal hernia, tender but reducible    Laboratory data: No recent relevant data    Imaging data: No recent relevant data      Assessment and plan:   -Initial symptomatic reducible left inguinal hernia  -History of prostate cancer status post prostatectomy, complicating above  -History of atrial fibrillation on Eliquis, complicating above  -Options discussed with patient.  I have recommended proceeding with open left inguinal hernia repair with mesh.  I think that his prior prostatectomy increases his risk of postoperative complications, as well as the need for postoperative anticoagulation.  We will plan to hold his Eliquis 3 days prior to his procedure and proceed with open inguinal hernia repair with mesh.  Risks, benefits and alternatives discussed, patient and family agreeable to proceed as outlined.    Risks associated with the procedure are noted to include, but not be limited to, bleeding, infection, injury to small or large intestine, major vascular structures, the bladder or ureters.  Possibility of postoperative inguinodynia, mesh migration or infection, hernia recurrence and seroma formation also discussed.      Dwaine David MD, FACS  General, Minimally Invasive and Endoscopic Surgery  [facility]    [address], [address]  [place], [place], [address]  P: [phone]  F: [phone]

## 2023-08-14 ENCOUNTER — TELEPHONE (OUTPATIENT)
Dept: SURGERY | Facility: CLINIC | Age: 83
End: 2023-08-14
Payer: MEDICARE

## 2023-08-14 NOTE — TELEPHONE ENCOUNTER
Left message informing patient that we needed to reschedule his surgery on 8/18 as Dr. David is going to be out. I did offer another date or another surgeron (Dr. Ramírez) if he wishes to have done this week.

## 2023-08-18 PROCEDURE — 93296 REM INTERROG EVL PM/IDS: CPT | Performed by: INTERNAL MEDICINE

## 2023-08-18 PROCEDURE — 93294 REM INTERROG EVL PM/LDLS PM: CPT | Performed by: INTERNAL MEDICINE

## 2023-08-23 ENCOUNTER — PRE-ADMISSION TESTING (OUTPATIENT)
Dept: PREADMISSION TESTING | Facility: HOSPITAL | Age: 83
End: 2023-08-23
Payer: MEDICARE

## 2023-08-23 VITALS
HEIGHT: 68 IN | BODY MASS INDEX: 21.82 KG/M2 | OXYGEN SATURATION: 99 % | HEART RATE: 91 BPM | WEIGHT: 144 LBS | RESPIRATION RATE: 18 BRPM | DIASTOLIC BLOOD PRESSURE: 82 MMHG | SYSTOLIC BLOOD PRESSURE: 128 MMHG | TEMPERATURE: 98.5 F

## 2023-08-23 DIAGNOSIS — K40.90 LEFT INGUINAL HERNIA: ICD-10-CM

## 2023-08-23 LAB
ANION GAP SERPL CALCULATED.3IONS-SCNC: 11.6 MMOL/L (ref 5–15)
BUN SERPL-MCNC: 17 MG/DL (ref 8–23)
BUN/CREAT SERPL: 18.1 (ref 7–25)
CALCIUM SPEC-SCNC: 10 MG/DL (ref 8.6–10.5)
CHLORIDE SERPL-SCNC: 102 MMOL/L (ref 98–107)
CO2 SERPL-SCNC: 26.4 MMOL/L (ref 22–29)
CREAT SERPL-MCNC: 0.94 MG/DL (ref 0.76–1.27)
DEPRECATED RDW RBC AUTO: 45.1 FL (ref 37–54)
EGFRCR SERPLBLD CKD-EPI 2021: 80.4 ML/MIN/1.73
ERYTHROCYTE [DISTWIDTH] IN BLOOD BY AUTOMATED COUNT: 11.9 % (ref 12.3–15.4)
GLUCOSE SERPL-MCNC: 103 MG/DL (ref 65–99)
HCT VFR BLD AUTO: 42.5 % (ref 37.5–51)
HGB BLD-MCNC: 14.4 G/DL (ref 13–17.7)
MCH RBC QN AUTO: 34.1 PG (ref 26.6–33)
MCHC RBC AUTO-ENTMCNC: 33.9 G/DL (ref 31.5–35.7)
MCV RBC AUTO: 100.7 FL (ref 79–97)
PLATELET # BLD AUTO: 152 10*3/MM3 (ref 140–450)
PMV BLD AUTO: 9.9 FL (ref 6–12)
POTASSIUM SERPL-SCNC: 4.4 MMOL/L (ref 3.5–5.2)
RBC # BLD AUTO: 4.22 10*6/MM3 (ref 4.14–5.8)
SODIUM SERPL-SCNC: 140 MMOL/L (ref 136–145)
WBC NRBC COR # BLD: 5.53 10*3/MM3 (ref 3.4–10.8)

## 2023-08-23 PROCEDURE — 85027 COMPLETE CBC AUTOMATED: CPT

## 2023-08-23 PROCEDURE — 80048 BASIC METABOLIC PNL TOTAL CA: CPT

## 2023-08-23 PROCEDURE — 36415 COLL VENOUS BLD VENIPUNCTURE: CPT

## 2023-08-23 RX ORDER — FEXOFENADINE HCL 180 MG/1
180 TABLET ORAL DAILY
COMMUNITY

## 2023-08-23 RX ORDER — MULTIPLE VITAMINS W/ MINERALS TAB 9MG-400MCG
1 TAB ORAL DAILY
COMMUNITY
End: 2023-08-23 | Stop reason: SDUPTHER

## 2023-08-23 RX ORDER — CHLORHEXIDINE GLUCONATE 500 MG/1
CLOTH TOPICAL TAKE AS DIRECTED
COMMUNITY
End: 2023-08-25 | Stop reason: HOSPADM

## 2023-08-23 NOTE — DISCHARGE INSTRUCTIONS
Take the following medications the morning of surgery: CARVEDILOL AND LEVOTHYROXINE      If you are on prescription narcotic pain medication to control your pain you may also take that medication the morning of surgery.    General Instructions:  Do not eat solid food after midnight the night before surgery.  You may drink clear liquids day of surgery but must stop at least one hour before your hospital arrival time.  It is beneficial for you to have a clear drink that contains carbohydrates the day of surgery.  We suggest a 12 to 20 ounce bottle of Gatorade or Powerade for non-diabetic patients or a 12 to 20 ounce bottle of G2 or Powerade Zero for diabetic patients. (Pediatric patients, are not advised to drink a 12 to 20 ounce carbohydrate drink)    Clear liquids are liquids you can see through.  Nothing red in color.     Plain water                               Sports drinks  Sodas                                   Gelatin (Jell-O)  Fruit juices without pulp such as white grape juice and apple juice  Popsicles that contain no fruit or yogurt  Tea or coffee (no cream or milk added)  Gatorade / Powerade  G2 / Powerade Zero    Infants may have breast milk up to four hours before surgery.  Infants drinking formula may drink formula up to six hours before surgery.   Patients who avoid smoking, chewing tobacco and alcohol for 4 weeks prior to surgery have a reduced risk of post-operative complications.  Quit smoking as many days before surgery as you can.  Do not smoke, use chewing tobacco or drink alcohol the day of surgery.   If applicable bring your C-PAP/ BI-PAP machine in with you to preop day of surgery.  Bring any papers given to you in the doctor's office.  Wear clean comfortable clothes.  Do not wear contact lenses, false eyelashes or make-up.  Bring a case for your glasses.   Bring crutches or walker if applicable.  Remove all piercings.  Leave jewelry and any other valuables at home.  Hair extensions with  metal clips must be removed prior to surgery.  The Pre-Admission Testing nurse will instruct you to bring medications if unable to obtain an accurate list in Pre-Admission Testing.        If you were given a blood bank ID arm band remember to bring it with you the day of surgery.    Preventing a Surgical Site Infection:  For 2 to 3 days before surgery, avoid shaving with a razor because the razor can irritate skin and make it easier to develop an infection.    Any areas of open skin can increase the risk of a post-operative wound infection by allowing bacteria to enter and travel throughout the body.  Notify your surgeon if you have any skin wounds / rashes even if it is not near the expected surgical site.  The area will need assessed to determine if surgery should be delayed until it is healed.  The night prior to surgery shower using a fresh bar of anti-bacterial soap (such as Dial) and clean washcloth.  Sleep in a clean bed with clean clothing.  Do not allow pets to sleep with you.  Shower on the morning of surgery using a fresh bar of anti-bacterial soap (such as Dial) and clean washcloth.  Dry with a clean towel and dress in clean clothing.  Ask your surgeon if you will be receiving antibiotics prior to surgery.  Make sure you, your family, and all healthcare providers clean their hands with soap and water or an alcohol based hand  before caring for you or your wound.    Day of surgery:  Your arrival time is approximately two hours before your scheduled surgery time.  Upon arrival, a Pre-op nurse and Anesthesiologist will review your health history, obtain vital signs, and answer questions you may have.  The only belongings needed at this time will be a list of your home medications and if applicable your C-PAP/BI-PAP machine.  A Pre-op nurse will start an IV and you may receive medication in preparation for surgery, including something to help you relax.     Please be aware that surgery does come  with discomfort.  We want to make every effort to control your discomfort so please discuss any uncontrolled symptoms with your nurse.   Your doctor will most likely have prescribed pain medications.      If you are going home after surgery you will receive individualized written care instructions before being discharged.  A responsible adult must drive you to and from the hospital on the day of your surgery and stay with you for 24 hours.  Discharge prescriptions can be filled by the hospital pharmacy during regular pharmacy hours.  If you are having surgery late in the day/evening your prescription may be e-prescribed to your pharmacy.  Please verify your pharmacy hours or chose a 24 hour pharmacy to avoid not having access to your prescription because your pharmacy has closed for the day.    If you are staying overnight following surgery, you will be transported to your hospital room following the recovery period.  [facility] has all private rooms.    If you have any questions please call Pre-Admission Testing at [phone].  Deductibles and co-payments are collected on the day of service. Please be prepared to pay the required co-pay, deductible or deposit on the day of service as defined by your plan.    Call your surgeon immediately if you experience any of the following symptoms:  Sore Throat  Shortness of Breath or difficulty breathing  Cough  Chills  Body soreness or muscle pain  Headache  Fever  New loss of taste or smell  Do not arrive for your surgery ill.  Your procedure will need to be rescheduled to another time.  You will need to call your physician before the day of surgery to avoid any unnecessary exposure to hospital staff as well as other patients.  CHLORHEXIDINE CLOTH INSTRUCTIONS  The morning of surgery follow these instructions using the Chlorhexidine cloths you've been given.  These steps reduce bacteria on the body.  Do not use the cloths near your eyes, ears mouth,  genitalia or on open wounds.  Throw the cloths away after use but do not try to flush them down a toilet.      Open and remove one cloth at a time from the package.    Leave the cloth unfolded and begin the bathing.  Massage the skin with the cloths using gentle pressure to remove bacteria.  Do not scrub harshly.   Follow the steps below with one 2% CHG cloth per area (6 total cloths).  One cloth for neck, shoulders and chest.  One cloth for both arms, hands, fingers and underarms (do underarms last).  One cloth for the abdomen followed by groin.  One cloth for right leg and foot including between the toes.  One cloth for left leg and foot including between the toes.  The last cloth is to be used for the back of the neck, back and buttocks.    Allow the CHG to air dry 3 minutes on the skin which will give it time to work and decrease the chance of irritation.  The skin may feel sticky until it is dry.  Do not rinse with water or any other liquid or you will lose the beneficial effects of the CHG.  If mild skin irritation occurs, do rinse the skin to remove the CHG.  Report this to the nurse at time of admission.  Do not apply lotions, creams, ointments, deodorants or perfumes after using the clothes. Dress in clean clothes before coming to the hospital.

## 2023-08-25 ENCOUNTER — ANESTHESIA EVENT (OUTPATIENT)
Dept: PERIOP | Facility: HOSPITAL | Age: 83
End: 2023-08-25
Payer: MEDICARE

## 2023-08-25 ENCOUNTER — ANESTHESIA (OUTPATIENT)
Dept: PERIOP | Facility: HOSPITAL | Age: 83
End: 2023-08-25
Payer: MEDICARE

## 2023-08-25 ENCOUNTER — HOSPITAL ENCOUNTER (OUTPATIENT)
Facility: HOSPITAL | Age: 83
Setting detail: HOSPITAL OUTPATIENT SURGERY
Discharge: HOME | End: 2023-08-25
Attending: SURGERY | Admitting: SURGERY
Payer: MEDICARE

## 2023-08-25 VITALS
DIASTOLIC BLOOD PRESSURE: 87 MMHG | HEART RATE: 81 BPM | SYSTOLIC BLOOD PRESSURE: 131 MMHG | OXYGEN SATURATION: 98 % | TEMPERATURE: 98.2 F | RESPIRATION RATE: 17 BRPM

## 2023-08-25 DIAGNOSIS — K40.90 LEFT INGUINAL HERNIA: ICD-10-CM

## 2023-08-25 PROCEDURE — C1781 MESH (IMPLANTABLE): HCPCS | Performed by: SURGERY

## 2023-08-25 PROCEDURE — 25010000002 SUGAMMADEX 200 MG/2ML SOLUTION: Performed by: NURSE ANESTHETIST, CERTIFIED REGISTERED

## 2023-08-25 PROCEDURE — 25010000002 CEFAZOLIN IN DEXTROSE 2-4 GM/100ML-% SOLUTION: Performed by: SURGERY

## 2023-08-25 PROCEDURE — 25010000002 FENTANYL CITRATE (PF) 50 MCG/ML SOLUTION: Performed by: NURSE ANESTHETIST, CERTIFIED REGISTERED

## 2023-08-25 PROCEDURE — 25010000002 ONDANSETRON PER 1 MG: Performed by: NURSE ANESTHETIST, CERTIFIED REGISTERED

## 2023-08-25 PROCEDURE — 88304 TISSUE EXAM BY PATHOLOGIST: CPT | Performed by: SURGERY

## 2023-08-25 PROCEDURE — 25010000002 PROPOFOL 10 MG/ML EMULSION: Performed by: NURSE ANESTHETIST, CERTIFIED REGISTERED

## 2023-08-25 PROCEDURE — 25010000002 PHENYLEPHRINE 10 MG/ML SOLUTION: Performed by: NURSE ANESTHETIST, CERTIFIED REGISTERED

## 2023-08-25 PROCEDURE — 49505 PRP I/HERN INIT REDUC >5 YR: CPT | Performed by: SURGERY

## 2023-08-25 PROCEDURE — 25010000002 DEXAMETHASONE PER 1 MG: Performed by: NURSE ANESTHETIST, CERTIFIED REGISTERED

## 2023-08-25 DEVICE — HORIZON TI ML 6 CLIPS/CART
Type: IMPLANTABLE DEVICE | Site: INGUINAL | Status: FUNCTIONAL
Brand: WECK

## 2023-08-25 DEVICE — BARD MESH LARGE PRE-SHAPED WITH KEYHOLE
Type: IMPLANTABLE DEVICE | Site: INGUINAL | Status: FUNCTIONAL
Brand: BARD MESH PRE-SHAPED

## 2023-08-25 RX ORDER — SODIUM CHLORIDE 0.9 % (FLUSH) 0.9 %
3 SYRINGE (ML) INJECTION EVERY 12 HOURS SCHEDULED
Status: DISCONTINUED | OUTPATIENT
Start: 2023-08-25 | End: 2023-08-25 | Stop reason: HOSPADM

## 2023-08-25 RX ORDER — SODIUM CHLORIDE 9 MG/ML
40 INJECTION, SOLUTION INTRAVENOUS AS NEEDED
Status: DISCONTINUED | OUTPATIENT
Start: 2023-08-25 | End: 2023-08-25 | Stop reason: HOSPADM

## 2023-08-25 RX ORDER — SODIUM CHLORIDE 0.9 % (FLUSH) 0.9 %
10 SYRINGE (ML) INJECTION EVERY 12 HOURS SCHEDULED
Status: DISCONTINUED | OUTPATIENT
Start: 2023-08-25 | End: 2023-08-25 | Stop reason: HOSPADM

## 2023-08-25 RX ORDER — HYDROCODONE BITARTRATE AND ACETAMINOPHEN 5; 325 MG/1; MG/1
1 TABLET ORAL EVERY 6 HOURS PRN
Qty: 20 TABLET | Refills: 0 | Status: SHIPPED | OUTPATIENT
Start: 2023-08-25

## 2023-08-25 RX ORDER — SODIUM CHLORIDE 0.9 % (FLUSH) 0.9 %
3-10 SYRINGE (ML) INJECTION AS NEEDED
Status: DISCONTINUED | OUTPATIENT
Start: 2023-08-25 | End: 2023-08-25 | Stop reason: HOSPADM

## 2023-08-25 RX ORDER — DEXAMETHASONE SODIUM PHOSPHATE 4 MG/ML
INJECTION, SOLUTION INTRA-ARTICULAR; INTRALESIONAL; INTRAMUSCULAR; INTRAVENOUS; SOFT TISSUE AS NEEDED
Status: DISCONTINUED | OUTPATIENT
Start: 2023-08-25 | End: 2023-08-25 | Stop reason: SURG

## 2023-08-25 RX ORDER — SODIUM CHLORIDE 0.9 % (FLUSH) 0.9 %
10 SYRINGE (ML) INJECTION AS NEEDED
Status: DISCONTINUED | OUTPATIENT
Start: 2023-08-25 | End: 2023-08-25 | Stop reason: HOSPADM

## 2023-08-25 RX ORDER — DROPERIDOL 2.5 MG/ML
0.62 INJECTION, SOLUTION INTRAMUSCULAR; INTRAVENOUS
Status: DISCONTINUED | OUTPATIENT
Start: 2023-08-25 | End: 2023-08-25 | Stop reason: HOSPADM

## 2023-08-25 RX ORDER — FAMOTIDINE 10 MG/ML
20 INJECTION, SOLUTION INTRAVENOUS ONCE
Status: COMPLETED | OUTPATIENT
Start: 2023-08-25 | End: 2023-08-25

## 2023-08-25 RX ORDER — MAGNESIUM HYDROXIDE 1200 MG/15ML
LIQUID ORAL AS NEEDED
Status: DISCONTINUED | OUTPATIENT
Start: 2023-08-25 | End: 2023-08-25 | Stop reason: HOSPADM

## 2023-08-25 RX ORDER — HYDROCODONE BITARTRATE AND ACETAMINOPHEN 7.5; 325 MG/1; MG/1
1 TABLET ORAL EVERY 4 HOURS PRN
Status: DISCONTINUED | OUTPATIENT
Start: 2023-08-25 | End: 2023-08-25 | Stop reason: HOSPADM

## 2023-08-25 RX ORDER — DIPHENHYDRAMINE HYDROCHLORIDE 50 MG/ML
12.5 INJECTION INTRAMUSCULAR; INTRAVENOUS
Status: DISCONTINUED | OUTPATIENT
Start: 2023-08-25 | End: 2023-08-25 | Stop reason: HOSPADM

## 2023-08-25 RX ORDER — IPRATROPIUM BROMIDE AND ALBUTEROL SULFATE 2.5; .5 MG/3ML; MG/3ML
3 SOLUTION RESPIRATORY (INHALATION) ONCE AS NEEDED
Status: DISCONTINUED | OUTPATIENT
Start: 2023-08-25 | End: 2023-08-25 | Stop reason: HOSPADM

## 2023-08-25 RX ORDER — LIDOCAINE HYDROCHLORIDE 10 MG/ML
0.5 INJECTION, SOLUTION INFILTRATION; PERINEURAL ONCE AS NEEDED
Status: DISCONTINUED | OUTPATIENT
Start: 2023-08-25 | End: 2023-08-25 | Stop reason: HOSPADM

## 2023-08-25 RX ORDER — BUPIVACAINE HYDROCHLORIDE AND EPINEPHRINE 2.5; 5 MG/ML; UG/ML
INJECTION, SOLUTION EPIDURAL; INFILTRATION; INTRACAUDAL; PERINEURAL AS NEEDED
Status: DISCONTINUED | OUTPATIENT
Start: 2023-08-25 | End: 2023-08-25 | Stop reason: HOSPADM

## 2023-08-25 RX ORDER — FENTANYL CITRATE 50 UG/ML
50 INJECTION, SOLUTION INTRAMUSCULAR; INTRAVENOUS ONCE AS NEEDED
Status: DISCONTINUED | OUTPATIENT
Start: 2023-08-25 | End: 2023-08-25 | Stop reason: HOSPADM

## 2023-08-25 RX ORDER — ONDANSETRON 2 MG/ML
4 INJECTION INTRAMUSCULAR; INTRAVENOUS ONCE AS NEEDED
Status: DISCONTINUED | OUTPATIENT
Start: 2023-08-25 | End: 2023-08-25 | Stop reason: HOSPADM

## 2023-08-25 RX ORDER — MIDAZOLAM HYDROCHLORIDE 1 MG/ML
0.5 INJECTION INTRAMUSCULAR; INTRAVENOUS
Status: DISCONTINUED | OUTPATIENT
Start: 2023-08-25 | End: 2023-08-25 | Stop reason: HOSPADM

## 2023-08-25 RX ORDER — LIDOCAINE HYDROCHLORIDE 20 MG/ML
INJECTION, SOLUTION INFILTRATION; PERINEURAL AS NEEDED
Status: DISCONTINUED | OUTPATIENT
Start: 2023-08-25 | End: 2023-08-25 | Stop reason: SURG

## 2023-08-25 RX ORDER — SODIUM CHLORIDE, SODIUM LACTATE, POTASSIUM CHLORIDE, CALCIUM CHLORIDE 600; 310; 30; 20 MG/100ML; MG/100ML; MG/100ML; MG/100ML
9 INJECTION, SOLUTION INTRAVENOUS CONTINUOUS
Status: DISCONTINUED | OUTPATIENT
Start: 2023-08-25 | End: 2023-08-25 | Stop reason: HOSPADM

## 2023-08-25 RX ORDER — PHENYLEPHRINE HYDROCHLORIDE 10 MG/ML
INJECTION INTRAVENOUS AS NEEDED
Status: DISCONTINUED | OUTPATIENT
Start: 2023-08-25 | End: 2023-08-25 | Stop reason: SURG

## 2023-08-25 RX ORDER — LABETALOL HYDROCHLORIDE 5 MG/ML
5 INJECTION, SOLUTION INTRAVENOUS
Status: DISCONTINUED | OUTPATIENT
Start: 2023-08-25 | End: 2023-08-25 | Stop reason: HOSPADM

## 2023-08-25 RX ORDER — HYDRALAZINE HYDROCHLORIDE 20 MG/ML
5 INJECTION INTRAMUSCULAR; INTRAVENOUS
Status: DISCONTINUED | OUTPATIENT
Start: 2023-08-25 | End: 2023-08-25 | Stop reason: HOSPADM

## 2023-08-25 RX ORDER — PROMETHAZINE HYDROCHLORIDE 25 MG/1
25 SUPPOSITORY RECTAL ONCE AS NEEDED
Status: DISCONTINUED | OUTPATIENT
Start: 2023-08-25 | End: 2023-08-25 | Stop reason: HOSPADM

## 2023-08-25 RX ORDER — FENTANYL CITRATE 50 UG/ML
25 INJECTION, SOLUTION INTRAMUSCULAR; INTRAVENOUS
Status: DISCONTINUED | OUTPATIENT
Start: 2023-08-25 | End: 2023-08-25 | Stop reason: HOSPADM

## 2023-08-25 RX ORDER — PROPOFOL 10 MG/ML
VIAL (ML) INTRAVENOUS AS NEEDED
Status: DISCONTINUED | OUTPATIENT
Start: 2023-08-25 | End: 2023-08-25 | Stop reason: SURG

## 2023-08-25 RX ORDER — ROCURONIUM BROMIDE 10 MG/ML
INJECTION, SOLUTION INTRAVENOUS AS NEEDED
Status: DISCONTINUED | OUTPATIENT
Start: 2023-08-25 | End: 2023-08-25 | Stop reason: SURG

## 2023-08-25 RX ORDER — FENTANYL CITRATE 50 UG/ML
INJECTION, SOLUTION INTRAMUSCULAR; INTRAVENOUS AS NEEDED
Status: DISCONTINUED | OUTPATIENT
Start: 2023-08-25 | End: 2023-08-25 | Stop reason: SURG

## 2023-08-25 RX ORDER — EPHEDRINE SULFATE 50 MG/ML
5 INJECTION, SOLUTION INTRAVENOUS ONCE AS NEEDED
Status: DISCONTINUED | OUTPATIENT
Start: 2023-08-25 | End: 2023-08-25 | Stop reason: HOSPADM

## 2023-08-25 RX ORDER — ONDANSETRON 2 MG/ML
INJECTION INTRAMUSCULAR; INTRAVENOUS AS NEEDED
Status: DISCONTINUED | OUTPATIENT
Start: 2023-08-25 | End: 2023-08-25 | Stop reason: SURG

## 2023-08-25 RX ORDER — FLUMAZENIL 0.1 MG/ML
0.2 INJECTION INTRAVENOUS AS NEEDED
Status: DISCONTINUED | OUTPATIENT
Start: 2023-08-25 | End: 2023-08-25 | Stop reason: HOSPADM

## 2023-08-25 RX ORDER — CEFAZOLIN SODIUM 2 G/100ML
2000 INJECTION, SOLUTION INTRAVENOUS ONCE
Status: COMPLETED | OUTPATIENT
Start: 2023-08-25 | End: 2023-08-25

## 2023-08-25 RX ORDER — NALOXONE HCL 0.4 MG/ML
0.2 VIAL (ML) INJECTION AS NEEDED
Status: DISCONTINUED | OUTPATIENT
Start: 2023-08-25 | End: 2023-08-25 | Stop reason: HOSPADM

## 2023-08-25 RX ORDER — AMOXICILLIN 250 MG
1 CAPSULE ORAL 2 TIMES DAILY
Qty: 30 TABLET | Refills: 1 | Status: SHIPPED | OUTPATIENT
Start: 2023-08-25

## 2023-08-25 RX ORDER — PROMETHAZINE HYDROCHLORIDE 25 MG/1
25 TABLET ORAL ONCE AS NEEDED
Status: DISCONTINUED | OUTPATIENT
Start: 2023-08-25 | End: 2023-08-25 | Stop reason: HOSPADM

## 2023-08-25 RX ORDER — HYDROCODONE BITARTRATE AND ACETAMINOPHEN 5; 325 MG/1; MG/1
1 TABLET ORAL ONCE AS NEEDED
Status: COMPLETED | OUTPATIENT
Start: 2023-08-25 | End: 2023-08-25

## 2023-08-25 RX ORDER — GLYCOPYRROLATE 0.2 MG/ML
INJECTION INTRAMUSCULAR; INTRAVENOUS AS NEEDED
Status: DISCONTINUED | OUTPATIENT
Start: 2023-08-25 | End: 2023-08-25 | Stop reason: SURG

## 2023-08-25 RX ORDER — HYDROMORPHONE HYDROCHLORIDE 1 MG/ML
0.25 INJECTION, SOLUTION INTRAMUSCULAR; INTRAVENOUS; SUBCUTANEOUS
Status: DISCONTINUED | OUTPATIENT
Start: 2023-08-25 | End: 2023-08-25 | Stop reason: HOSPADM

## 2023-08-25 RX ADMIN — DEXAMETHASONE SODIUM PHOSPHATE 8 MG: 4 INJECTION, SOLUTION INTRA-ARTICULAR; INTRALESIONAL; INTRAMUSCULAR; INTRAVENOUS; SOFT TISSUE at 14:20

## 2023-08-25 RX ADMIN — ONDANSETRON 4 MG: 2 INJECTION INTRAMUSCULAR; INTRAVENOUS at 15:09

## 2023-08-25 RX ADMIN — PHENYLEPHRINE HYDROCHLORIDE 100 MCG: 10 INJECTION INTRAVENOUS at 14:26

## 2023-08-25 RX ADMIN — FENTANYL CITRATE 25 MCG: 50 INJECTION, SOLUTION INTRAMUSCULAR; INTRAVENOUS at 14:44

## 2023-08-25 RX ADMIN — ROCURONIUM BROMIDE 50 MG: 10 INJECTION, SOLUTION INTRAVENOUS at 14:14

## 2023-08-25 RX ADMIN — GLYCOPYRROLATE 0.2 MG: 0.2 INJECTION INTRAMUSCULAR; INTRAVENOUS at 14:14

## 2023-08-25 RX ADMIN — PROPOFOL 120 MG: 10 INJECTION, EMULSION INTRAVENOUS at 14:14

## 2023-08-25 RX ADMIN — HYDROCODONE BITARTRATE AND ACETAMINOPHEN 1 TABLET: 5; 325 TABLET ORAL at 16:07

## 2023-08-25 RX ADMIN — LIDOCAINE HYDROCHLORIDE 80 MG: 20 INJECTION, SOLUTION INFILTRATION; PERINEURAL at 14:14

## 2023-08-25 RX ADMIN — PHENYLEPHRINE HYDROCHLORIDE 100 MCG: 10 INJECTION INTRAVENOUS at 15:06

## 2023-08-25 RX ADMIN — FENTANYL CITRATE 25 MCG: 50 INJECTION, SOLUTION INTRAMUSCULAR; INTRAVENOUS at 15:13

## 2023-08-25 RX ADMIN — FAMOTIDINE 20 MG: 10 INJECTION INTRAVENOUS at 11:05

## 2023-08-25 RX ADMIN — FENTANYL CITRATE 25 MCG: 50 INJECTION, SOLUTION INTRAMUSCULAR; INTRAVENOUS at 14:32

## 2023-08-25 RX ADMIN — SODIUM CHLORIDE, POTASSIUM CHLORIDE, SODIUM LACTATE AND CALCIUM CHLORIDE 9 ML/HR: 600; 310; 30; 20 INJECTION, SOLUTION INTRAVENOUS at 11:05

## 2023-08-25 RX ADMIN — PHENYLEPHRINE HYDROCHLORIDE 100 MCG: 10 INJECTION INTRAVENOUS at 14:14

## 2023-08-25 RX ADMIN — CEFAZOLIN SODIUM 2000 MG: 2 INJECTION, SOLUTION INTRAVENOUS at 14:02

## 2023-08-25 RX ADMIN — SUGAMMADEX 200 MG: 100 INJECTION, SOLUTION INTRAVENOUS at 15:12

## 2023-08-25 RX ADMIN — PHENYLEPHRINE HYDROCHLORIDE 100 MCG: 10 INJECTION INTRAVENOUS at 14:24

## 2023-08-25 RX ADMIN — FENTANYL CITRATE 25 MCG: 50 INJECTION, SOLUTION INTRAMUSCULAR; INTRAVENOUS at 14:50

## 2023-08-25 NOTE — ANESTHESIA PREPROCEDURE EVALUATION
Anesthesia Evaluation     NPO Solid Status: > 8 hours  NPO Liquid Status: > 2 hours           Airway   Mallampati: II  Dental    (+) implants    Comment: Prominent capped incisors,  Risks of dental trauma discussed    Pulmonary - negative pulmonary ROS   Cardiovascular     (+) pacemaker, dysrhythmias, hyperlipidemia,  carotid artery disease      Neuro/Psych  (+) syncope  GI/Hepatic/Renal/Endo    (+) thyroid problem hypothyroidism    Musculoskeletal     Abdominal    Substance History      OB/GYN          Other                        Anesthesia Plan    ASA 3     general     (Allergic to aspirin)  intravenous induction     Anesthetic plan, risks, benefits, and alternatives have been provided, discussed and informed consent has been obtained with: patient.    CODE STATUS:

## 2023-08-25 NOTE — OP NOTE
Operative Note :   Dwaine David MD    Jean Claude WALSH Page  1940    Procedure Date: 08/25/23    Pre-op Diagnosis:  Left inguinal hernia [K40.90]    Post-Op Diagnosis:  Same    Procedure:   Open left inguinal hernia repair with mesh placement    Surgeon: Dwaine David MD    Assistant:  Fe Multani CSA was responsible for performing the following activities: Retraction, Suction, Irrigation, Suturing, Closing, and Placing Dressing and their skilled assistance was necessary for the success of this case.      Anesthesia:  General (general endotracheal tube)    EBL:   minimal    Specimens:   Lipoma of the cord    Indications:  83-year-old gentleman with an initial symptomatic and reducible left inguinal hernia.  Options discussed and we elected to proceed with an open approach given his previous open prostatectomy.    Findings:   Direct defect on the left    Recommendations:   Routine post hernia care    Description of procedure:    After obtaining informed consent, the patient was brought to the operating room and placed under a general anesthetic.  The groin and abdomen was sterilely prepped and draped after being clipped.  Oblique incision just below the line connecting the anterior superior iliac spine and pubic tubercle was made dissected through the skin and subcutaneous tissue.  Mari's fascia was  and the external oblique fascia was identified and cleared.  The external ring was identified.  The external bleak fascia was opened through the external ring and then opened laterally.  The cord contents were  from the deep surface of the internal ring as well as the internal musculature and fascia more superiorly.  I placed my finger onto the pubic tubercle and swept the cord contents and placed a Penrose drain around this.  It appeared to be a direct defect.  I did not identify any sac.  There is a moderate size lipoma of the cord  away from the cord structures and I then dissected down  to the internal inguinal ring.  The defect was fairly small.  I selected a large Bard keyhole preshaped polypropylene mesh and oriented this appropriately.  It was secured medially to the pubic tubercle and then first ran along the inferior aspect along the shelving edge of the inguinal ligament using 2-0 PDS extending out well lateral to the internal ring.  I then superiorly ran another 2-0 PDS suture again from the pubic tubercle out laterally along the external oblique musculature, again well beyond the internal ring until the 2 tails of the mesh were connected around the cord structures, recreating the internal ring.  The external oblique fascia was then closed with 2-0 Vicryl over the mesh.  Mari's fascia was closed with running 3-0 Vicryl and the skin was closed with a running 4-0 Monocryl subcuticular.    Dwaine David MD  General and Endoscopic Surgery  [facility]    [address], [address]  [place], [place], [address]  P: [phone]  F: [phone]

## 2023-08-25 NOTE — ANESTHESIA POSTPROCEDURE EVALUATION
Patient: Jean Claude Ramirez    Procedure Summary       Date: 08/25/23 Room / Location: [facility] OR  / [facility] MAIN OR    Anesthesia Start: 1408 Anesthesia Stop: 1528    Procedure: left INGUINAL HERNIA REPAIR with mesh (Left: Abdomen) Diagnosis:       Left inguinal hernia      (Left inguinal hernia [K40.90])    Surgeons: Dwaine David MD Provider: Elli Hernandez MD    Anesthesia Type: general ASA Status: 3            Anesthesia Type: general    Vitals  Vitals Value Taken Time   /90 08/25/23 1645   Temp 36.8 øC (98.2 øF) 08/25/23 1630   Pulse 70 08/25/23 1649   Resp 16 08/25/23 1600   SpO2 99 % 08/25/23 1649   Vitals shown include unvalidated device data.        Post Anesthesia Care and Evaluation    Patient location during evaluation: bedside  Patient participation: complete - patient participated  Level of consciousness: awake and alert  Pain score: 0  Pain management: adequate    Airway patency: patent  Anesthetic complications: No anesthetic complications    Cardiovascular status: acceptable  Respiratory status: acceptable  Hydration status: acceptable    Comments: /90 (BP Location: Right arm, Patient Position: Lying)   Pulse 81   Temp 36.8 øC (98.2 øF) (Oral)   Resp 16   SpO2 98%

## 2023-08-25 NOTE — ANESTHESIA PROCEDURE NOTES
Airway  Urgency: elective    Date/Time: 8/25/2023 2:17 PM  Airway not difficult    General Information and Staff    Patient location during procedure: OR  Anesthesiologist: Elli Hernandez MD  CRNA/CAA: Nohemy De La Fuente CRNA    Indications and Patient Condition  Indications for airway management: airway protection    Preoxygenated: yes  Mask difficulty assessment: 1 - vent by mask    Final Airway Details  Final airway type: endotracheal airway      Successful airway: ETT  Cuffed: yes   Successful intubation technique: direct laryngoscopy  Facilitating devices/methods: anterior pressure/BURP  Endotracheal tube insertion site: oral  Blade: Eleonora  Blade size: 3  ETT size (mm): 7.5  Cormack-Lehane Classification: grade IIb - view of arytenoids or posterior of glottis only  Placement verified by: chest auscultation and capnometry   Measured from: lips  ETT/EBT  to lips (cm): 21  Number of attempts at approach: 1  Assessment: lips, teeth, and gum same as pre-op and atraumatic intubation

## 2023-08-28 LAB
LAB AP CASE REPORT: NORMAL
PATH REPORT.FINAL DX SPEC: NORMAL
PATH REPORT.GROSS SPEC: NORMAL

## 2023-09-06 ENCOUNTER — OFFICE VISIT (OUTPATIENT)
Dept: SURGERY | Facility: CLINIC | Age: 83
End: 2023-09-06
Payer: MEDICARE

## 2023-09-06 VITALS — WEIGHT: 145.4 LBS | HEIGHT: 68 IN | BODY MASS INDEX: 22.04 KG/M2

## 2023-09-06 DIAGNOSIS — K40.90 LEFT INGUINAL HERNIA: Primary | ICD-10-CM

## 2023-09-06 NOTE — PROGRESS NOTES
Follow-up open left inguinal hernia repair    Subjective:  Feels well, pain with significant early on but now he feels quite well, he feels he is walking well, appetite okay, bowels functioning and urinary function normal.    Objective:  BMI 22.1  General: Awake and alert without distress  Abdomen: Soft and benign  Genitourinary: Left groin incision healing nicely, no sign of infection and no sign of hernia recurrence or seroma    Assessment and plan:  -Status post open left inguinal hernia repair with mesh (history of prostatectomy, open I believe)  -Recovering well to this point  -Plan to follow-up in 4 weeks, anticipate his status will be close to normal at that time    Dwaine David MD  General and Endoscopic Surgery  [facility]    [address], [address]  [place], [place], [address]  P: [phone]  F: [phone]

## 2023-09-06 NOTE — LETTER
September 6, 2023     Mahad Davey MD  [address]  [facility] [address]  [facility] [address]    Patient: Jean Claude Ramirez   YOB: 1940   Date of Visit: 9/6/2023     Dear Mahad Davey MD:       Thank you for referring Jean Claude Ramirez to me for evaluation. Below are the relevant portions of my assessment and plan of care.    If you have questions, please do not hesitate to call me. I look forward to following Jean Claude along with you.         Sincerely,        Dwaine David MD        CC: No Recipients    Dwaine David MD  09/06/23 1415  Sign when Signing Visit  Follow-up open left inguinal hernia repair    Subjective:  Feels well, pain with significant early on but now he feels quite well, he feels he is walking well, appetite okay, bowels functioning and urinary function normal.    Objective:  BMI 22.1  General: Awake and alert without distress  Abdomen: Soft and benign  Genitourinary: Left groin incision healing nicely, no sign of infection and no sign of hernia recurrence or seroma    Assessment and plan:  -Status post open left inguinal hernia repair with mesh (history of prostatectomy, open I believe)  -Recovering well to this point  -Plan to follow-up in 4 weeks, anticipate his status will be close to normal at that time    Dwaine David MD  General and Endoscopic Surgery  [facility]    [address], [address]  [place], [place], [address]  P: [phone]  F: [phone]

## 2023-09-25 ENCOUNTER — CLINICAL SUPPORT NO REQUIREMENTS (OUTPATIENT)
Dept: CARDIOLOGY | Facility: CLINIC | Age: 83
End: 2023-09-25

## 2023-09-25 ENCOUNTER — OFFICE VISIT (OUTPATIENT)
Dept: CARDIOLOGY | Facility: CLINIC | Age: 83
End: 2023-09-25

## 2023-09-25 VITALS
DIASTOLIC BLOOD PRESSURE: 62 MMHG | HEART RATE: 81 BPM | BODY MASS INDEX: 22.13 KG/M2 | WEIGHT: 146 LBS | SYSTOLIC BLOOD PRESSURE: 112 MMHG | HEIGHT: 68 IN

## 2023-09-25 DIAGNOSIS — Z95.0 CARDIAC PACEMAKER: ICD-10-CM

## 2023-09-25 DIAGNOSIS — I49.5 SICK SINUS SYNDROME: Primary | ICD-10-CM

## 2023-09-25 DIAGNOSIS — I48.0 PAROXYSMAL ATRIAL FIBRILLATION: ICD-10-CM

## 2023-09-25 DIAGNOSIS — I49.5 SICK SINUS SYNDROME: ICD-10-CM

## 2023-09-25 DIAGNOSIS — I44.30 AV BLOCK: Primary | ICD-10-CM

## 2023-09-25 DIAGNOSIS — E78.49 OTHER HYPERLIPIDEMIA: ICD-10-CM

## 2023-09-25 PROCEDURE — 93000 ELECTROCARDIOGRAM COMPLETE: CPT | Performed by: INTERNAL MEDICINE

## 2023-09-25 PROCEDURE — 99214 OFFICE O/P EST MOD 30 MIN: CPT | Performed by: INTERNAL MEDICINE

## 2023-09-25 NOTE — PROGRESS NOTES
Date of Office Visit: 23    Encounter Provider: James Riddle MD  Place of Service: [facility]  Patient Name: Jean Claude Ramirez  :1940    Chief Complaint   Patient presents with    Atrial Fibrillation    Follow-up     3 month     HPI:   Jean Claude Ramirez is a 83 y.o. male He has a history of hyperlipidemia, sick sinus syndrome status post dual-chamber permanent pacemaker placement in , paroxysmal atrial fibrillation, who presents back to see me in follow-up.  He is continuing on Eliquis therapy without any bleeding complications.  He did have issues with his beta-blocker leading to lower blood pressures and significant fatigue.  He states that since coming off of the carvedilol he has noticed some improvement in that but his blood pressure still running low normal.  He denies any recent issues with chest pain or palpitations.  He has pacemaker was interrogated.  His A-fib burden is 100% at this point but his atrial high rates have been infrequent.  His main complaint at this point is fatigue that he feels like is moderate in intensity.  He still continues to walk without any chest pain or dyspnea.  He denies the sensation of tachycardia    Previous testing and notes have been reviewed by me.   Past Medical History:   Diagnosis Date    Acquired hypothyroidism 2016    Atypical chest pain 2016    AV block     Cancer 2010    Prostate surgery    Colon polyp 16    H/O echocardiogram 2013    Health care maintenance     History of EKG 2015    Hyperlipidemia 2016    Inguinal hernia     Medicare annual wellness visit, subsequent 12/15/2017    Mobitz type II block     Pacemaker        Past Surgical History:   Procedure Laterality Date    CARDIAC PACEMAKER PLACEMENT      EYE SURGERY      Cataracs    FRACTURE SURGERY  10/6/10    Rotator cup surgery    INGUINAL HERNIA REPAIR Left 2023    Procedure: left INGUINAL  HERNIA REPAIR with mesh;  Surgeon: Dwaine David MD;  Location: [facility];  Service: General;  Laterality: Left;    PACEMAKER IMPLANTATION  01/25/2013    PROSTATE SURGERY      PROSTHODONTIC PROCEDURE      SHOULDER SURGERY Left        Social History     Socioeconomic History    Marital status:    Tobacco Use    Smoking status: Never    Smokeless tobacco: Never    Tobacco comments:     CAFFEINE USE- 2 CUPS COFFEE DAILY   Vaping Use    Vaping Use: Never used   Substance and Sexual Activity    Alcohol use: Yes     Alcohol/week: 7.0 standard drinks     Types: 7 Shots of liquor per week     Comment: Social Drinker    Drug use: No    Sexual activity: Never     Partners: Female       Family History   Problem Relation Age of Onset    Cancer Mother         Breast    Early death Mother     Heart disease Father     Cancer Brother 60        Colon cancer    Parkinsonism Brother         surgeon - [place]    Mal Hyperthermia Neg Hx        Review of Systems   Constitutional: Negative. Negative for fever and malaise/fatigue.   HENT: Negative.  Negative for nosebleeds and sore throat.    Eyes: Negative.  Negative for blurred vision and double vision.   Cardiovascular:  Negative for chest pain, claudication, irregular heartbeat, palpitations and syncope.   Respiratory: Negative.  Negative for cough, shortness of breath and snoring.    Endocrine: Negative.  Negative for cold intolerance, heat intolerance and polydipsia.   Hematologic/Lymphatic:             Skin: Negative.  Negative for itching, poor wound healing and rash.   Musculoskeletal: Negative.  Negative for joint pain, joint swelling, muscle weakness and myalgias.   Gastrointestinal: Negative.  Negative for abdominal pain, melena, nausea and vomiting.   Genitourinary: Negative.    Neurological: Negative.  Negative for light-headedness, loss of balance, seizures, vertigo and weakness.   Psychiatric/Behavioral: Negative.  Negative for altered mental  "status and depression.    Allergic/Immunologic: Negative.      Allergies   Allergen Reactions    Aspirin Anaphylaxis         Current Outpatient Medications:     ascorbic acid (VITAMIN C) 1000 MG tablet, Take 1 tablet by mouth Daily., Disp: , Rfl:     Azelastine HCl 137 MCG/SPRAY solution, 1 spray As Needed. USE 1 TO 2 SPRAY(S) IN EACH NOSTRIL TWICE DAILY AS NEEDED FOR BREAKTHROUGH RHINITIS SYMPTOMS, Disp: , Rfl:     B Complex Vitamins (B COMPLEX 1 PO), Take 1 tablet by mouth Daily., Disp: , Rfl:     donepezil (ARICEPT) 10 MG tablet, Take 1 tablet by mouth Every Night., Disp: , Rfl:     Eliquis 5 MG tablet tablet, Take 1 tablet by mouth Every 12 (Twelve) Hours. (Patient taking differently: Take 1 tablet by mouth Every 12 (Twelve) Hours. HELD FOR SURGERY LAST DOSE 8/21/23), Disp: 180 tablet, Rfl: 3    fluticasone (FLONASE) 50 MCG/ACT nasal spray, 2 sprays Daily., Disp: , Rfl:     levothyroxine (SYNTHROID, LEVOTHROID) 50 MCG tablet, Take 1 tablet by mouth Daily., Disp: , Rfl:     Multiple Vitamin tablet, Take 1 tablet by mouth Daily., Disp: , Rfl:     PATIENT SUPPLIED ALLERGY INJECTION, Inject  under the skin into the appropriate area as directed Every 30 (Thirty) Days., Disp: , Rfl:     pravastatin (PRAVACHOL) 40 MG tablet, TAKE 1 TABLET BY MOUTH ONCE DAILY (Patient taking differently: Take 1 tablet by mouth Every Night.), Disp: 90 tablet, Rfl: 3      Objective:     Vitals:    09/25/23 1235   BP: 112/62   Pulse: 81   Weight: 66.2 kg (146 lb)   Height: 172.7 cm (68\")     Body mass index is 22.2 kg/m².    PHYSICAL EXAM:    Constitutional:       Appearance: Healthy appearance. Well-developed and not in distress.   Eyes:      General: No scleral icterus.     Conjunctiva/sclera: Conjunctivae normal.   HENT:      Head: Normocephalic and atraumatic.   Neck:      Thyroid: No thyromegaly.      Vascular: Normal carotid pulses. No carotid bruit, hepatojugular reflux, JVD or JVR. JVD normal.      Trachea: No tracheal deviation. "   Pulmonary:      Effort: Pulmonary effort is normal. No respiratory distress.      Breath sounds: Normal breath sounds. No decreased breath sounds. No wheezing. No rhonchi. No rales.   Chest:      Chest wall: Not tender to palpatation.   Cardiovascular:      PMI at left midclavicular line. Normal rate. Irregularly irregular rhythm. Normal S1. Normal S2.       Murmurs: There is no murmur.      No gallop.  No click. No rub.   Pulses:     Intact distal pulses.      Carotid: 2+ bilaterally.     Radial: 2+ bilaterally.     Femoral: 2+ bilaterally.     Dorsalis pedis: 2+ bilaterally.     Posterior tibial: 2+ bilaterally.  Edema:     Peripheral edema absent.   Abdominal:      General: Bowel sounds are normal. There is no distension.      Palpations: Abdomen is soft.      Tenderness: There is no abdominal tenderness.   Musculoskeletal: Normal range of motion.         General: No tenderness or deformity.      Cervical back: Normal range of motion and neck supple. Skin:     General: Skin is warm and dry.      Findings: No erythema or rash.   Neurological:      General: No focal deficit present.      Mental Status: Alert, oriented to person, place, and time and oriented to person, place and time.      Sensory: No sensory deficit.   Psychiatric:         Behavior: Behavior normal.         ECG 12 Lead    Date/Time: 9/25/2023 1:08 PM  Performed by: James Riddle MD  Authorized by: James Riddle MD   Comparison: compared with previous ECG from 6/2/2023  Similar to previous ECG  Rhythm: atrial fibrillation  Ectopy: unifocal PVCs          Assessment:       Diagnosis Plan   1. Sick sinus syndrome        2. Cardiac pacemaker        3. Other hyperlipidemia          Orders Placed This Encounter   Procedures    ECG 12 Lead     This order was created via procedure documentation     Order Specific Question:   Release to patient     Answer:   Routine Release [[id number]]          Plan:       1.  Sick sinus syndrome status  post dual-chamber permanent pacemaker placement in 2013:    2.  Hyperlipidemia: Continue pravastatin therapy.  No changes indicated.  Seems to be tolerating this well.    3.  Persistent atrial fibrillation: Ventricular rate predominately well controlled.  Infrequent RVR.  Continue Eliquis therapy at current dose.  Not on beta-blockade at this point in time secondary to significant fatigue and lower blood pressures.  Reasonable.  We will continue to monitor on his pacemaker.           Your medication list            Accurate as of September 25, 2023  1:11 PM. If you have any questions, ask your nurse or doctor.                CHANGE how you take these medications        Instructions Last Dose Given Next Dose Due   Eliquis 5 MG tablet tablet  Generic drug: apixaban  What changed: additional instructions      Take 1 tablet by mouth Every 12 (Twelve) Hours.       pravastatin 40 MG tablet  Commonly known as: PRAVACHOL  What changed: when to take this      TAKE 1 TABLET BY MOUTH ONCE DAILY              CONTINUE taking these medications        Instructions Last Dose Given Next Dose Due   ascorbic acid 1000 MG tablet  Commonly known as: VITAMIN C      Take 1 tablet by mouth Daily.       Azelastine HCl 137 MCG/SPRAY solution      1 spray As Needed. USE 1 TO 2 SPRAY(S) IN EACH NOSTRIL TWICE DAILY AS NEEDED FOR BREAKTHROUGH RHINITIS SYMPTOMS       B COMPLEX 1 PO      Take 1 tablet by mouth Daily.       donepezil 10 MG tablet  Commonly known as: ARICEPT      Take 1 tablet by mouth Every Night.       fluticasone 50 MCG/ACT nasal spray  Commonly known as: FLONASE      2 sprays Daily.       levothyroxine 50 MCG tablet  Commonly known as: SYNTHROID, LEVOTHROID      Take 1 tablet by mouth Daily.       multivitamin tablet tablet  Commonly known as: THERAGRAN      Take 1 tablet by mouth Daily.       PATIENT SUPPLIED ALLERGY INJECTION      Inject  under the skin into the appropriate area as directed Every 30 (Thirty) Days.               STOP taking these medications      carvedilol 3.125 MG tablet  Commonly known as: COREG  Stopped by: James Riddle MD

## 2023-10-09 ENCOUNTER — OFFICE VISIT (OUTPATIENT)
Dept: SURGERY | Facility: CLINIC | Age: 83
End: 2023-10-09
Payer: MEDICARE

## 2023-10-09 VITALS
HEIGHT: 68 IN | SYSTOLIC BLOOD PRESSURE: 115 MMHG | BODY MASS INDEX: 22.43 KG/M2 | DIASTOLIC BLOOD PRESSURE: 68 MMHG | WEIGHT: 148 LBS

## 2023-10-09 DIAGNOSIS — K40.90 LEFT INGUINAL HERNIA: Primary | ICD-10-CM

## 2023-10-09 PROCEDURE — 99024 POSTOP FOLLOW-UP VISIT: CPT | Performed by: SURGERY

## 2023-10-09 PROCEDURE — 1160F RVW MEDS BY RX/DR IN RCRD: CPT | Performed by: SURGERY

## 2023-10-09 PROCEDURE — 1159F MED LIST DOCD IN RCRD: CPT | Performed by: SURGERY

## 2023-10-09 NOTE — PROGRESS NOTES
Follow-up open left inguinal hernia repair    Subjective:  This nice gentleman is now around 6 weeks out and is overall doing very well.  He has minimal discomfort at this time and is able to move.  Appetite and bowel function are at baseline.    Objective:  BMI 22.5  General: Awake and alert without distress  Genitourinary: Incision site in the left groin is well-healed, no sign of hernia recurrence, seroma or other complicating factor    Assessment and plan:  -Postop open left inguinal hernia repair, recovering well  -Okay to resume any and all activity from my standpoint  -Follow-up here as needed    Dwaine David MD  General and Endoscopic Surgery  [facility]    [address], [address]  [place], [place], [address]  P: [phone]  F: [phone]

## 2023-10-09 NOTE — LETTER
October 9, 2023     Mahad Davey MD  [address]  [facility] [address]  [facility] [address]    Patient: Jean Claude Ramirez   YOB: 1940   Date of Visit: 10/9/2023     Dear Mahad Davey MD:       Thank you for referring Jean Claude Ramirez to me for evaluation. Below are the relevant portions of my assessment and plan of care.    If you have questions, please do not hesitate to call me. I look forward to following Jean Claude along with you.         Sincerely,        Dwaine David MD        CC: No Recipients    Dwaine David MD  10/09/23 8033  Sign when Signing Visit  Follow-up open left inguinal hernia repair    Subjective:  This nice gentleman is now around 6 weeks out and is overall doing very well.  He has minimal discomfort at this time and is able to move.  Appetite and bowel function are at baseline.    Objective:  BMI 22.5  General: Awake and alert without distress  Genitourinary: Incision site in the left groin is well-healed, no sign of hernia recurrence, seroma or other complicating factor    Assessment and plan:  -Postop open left inguinal hernia repair, recovering well  -Okay to resume any and all activity from my standpoint  -Follow-up here as needed    Dwaine David MD  General and Endoscopic Surgery  [facility]    [address], [address]  [place], [place], [address]  P: [phone]  F: [phone]

## 2024-01-23 NOTE — INTERVAL H&P NOTE
H&P reviewed. The patient was examined and there are no changes to the H&P.         Appointment Confirmation   Who are you speaking with? Spouse   If it is not the patient, are they listed on an active communication consent form? Yes   Which provider is the appointment scheduled with?  Dr. Kemp   When is the appointment scheduled?  Please list date and time 2/21 10am   At which location is the appointment scheduled to take place? [place]   Did caller verbalize understanding of appointment details? Yes

## 2024-02-06 ENCOUNTER — TELEPHONE (OUTPATIENT)
Dept: CARDIOLOGY | Facility: CLINIC | Age: 84
End: 2024-02-06
Payer: MEDICARE

## 2024-02-06 NOTE — TELEPHONE ENCOUNTER
Caller: Diana Ramirez    Relationship: Emergency Contact    Best call back number: [phone]    What is the best time to reach you: ANY    Who are you requesting to speak with (clinical staff, provider,  specific staff member): ANY    Do you know the name of the person who called:     What was the call regarding: PATIENT WAS SUPPOSED TO HAVE A HOME DEVICE CHECK.  THEY ARE HAVING ISSUES WITH THE MACHINE SO THE REPORT MAY BE SENT LATER.  THEY ARE CALLING THE COMPANY TO SEE WHAT TO DO.    Is it okay if the provider responds through [employer]:

## 2024-02-08 NOTE — TELEPHONE ENCOUNTER
Spoke to wife- will watch for remote send once new home monitor is delivered- [employer] is sending new one per wife

## 2024-03-25 ENCOUNTER — OFFICE VISIT (OUTPATIENT)
Dept: CARDIOLOGY | Facility: CLINIC | Age: 84
End: 2024-03-25
Payer: MEDICARE

## 2024-03-25 VITALS
WEIGHT: 145.4 LBS | DIASTOLIC BLOOD PRESSURE: 64 MMHG | HEART RATE: 106 BPM | BODY MASS INDEX: 22.04 KG/M2 | HEIGHT: 68 IN | SYSTOLIC BLOOD PRESSURE: 122 MMHG

## 2024-03-25 DIAGNOSIS — I48.19 ATRIAL FIBRILLATION, PERSISTENT: ICD-10-CM

## 2024-03-25 DIAGNOSIS — E78.49 OTHER HYPERLIPIDEMIA: Primary | ICD-10-CM

## 2024-03-25 DIAGNOSIS — I44.30 AV BLOCK: ICD-10-CM

## 2024-03-25 PROCEDURE — 99214 OFFICE O/P EST MOD 30 MIN: CPT | Performed by: NURSE PRACTITIONER

## 2024-03-25 PROCEDURE — 93000 ELECTROCARDIOGRAM COMPLETE: CPT | Performed by: NURSE PRACTITIONER

## 2024-03-25 RX ORDER — APIXABAN 5 MG/1
5 TABLET, FILM COATED ORAL EVERY 12 HOURS SCHEDULED
Qty: 180 TABLET | Refills: 3 | Status: SHIPPED | OUTPATIENT
Start: 2024-03-25

## 2024-03-25 NOTE — PROGRESS NOTES
Date of Office Visit: 2024  Encounter Provider: SCOOTER Hayes  Place of Service: [facility] CARDIOLOGY  Patient Name: Jean Claude Ramirez  :1940    No chief complaint on file.  : persistent atrial fibrillation    HPI: Jean Claude Ramirez is a 83 y.o. male who is a patient of Dr. Riddle.  He presents for 6 month follow up.  He has a history of hyperlipidemia, sick sinus syndrome status post dual-chamber permanent pacemaker placement in  and paroxysmal atrial fibrillation.  Saw patient after he attended his grandsons high school graduation last year and was hospitalized for atrial fibrillation.  He was started on Eliquis at that time as well as metoprolol tartrate.    He was having issues with lower blood pressures and significant fatigue on beta-blocker.  At his last office visit with Dr. Riddle in September, pacemaker was interrogated.  A-fib burden was 100% but his atrial high rates were infrequent.  On most recent remote download in 2023, he continued to be 100% AT/ AF burden. Only three high atrial rate episodes.  With pacemaker department and he also had a remote download in 2024.    Mr. Ramirez presents today with no complaints.  His blood pressure is well-controlled.  EKG is stable.  PCP follows his lipid panel which is in target range.  He has had no chest pain, palpitations, lightheadedness or shortness of air.  Remains off beta-blocker with continued rate control.  No bleeding issues on Eliquis.    Previous testing and notes have been reviewed by me.   Past Medical History:   Diagnosis Date    Abnormal ECG 2023    AFib    Acquired hypothyroidism 2016    Atypical chest pain 2016    AV block     Cancer 2010    Prostate surgery    Colon polyp 16    H/O echocardiogram 2013    Health care maintenance     History of EKG 2015    Hyperlipidemia 2016    Inguinal hernia     Medicare annual wellness visit,  subsequent 12/15/2017    Mobitz type II block     Pacemaker        Past Surgical History:   Procedure Laterality Date    CARDIAC PACEMAKER PLACEMENT      EYE SURGERY  2000/1/2/08    Cataracs    FRACTURE SURGERY  10/6/10    Rotator cup surgery    INGUINAL HERNIA REPAIR Left 8/25/2023    Procedure: left INGUINAL HERNIA REPAIR with mesh;  Surgeon: Dwaine David MD;  Location: [facility] OR;  Service: General;  Laterality: Left;    PACEMAKER IMPLANTATION  01/25/2013    PROSTATE SURGERY      PROSTHODONTIC PROCEDURE      SHOULDER SURGERY Left        Social History     Socioeconomic History    Marital status:    Tobacco Use    Smoking status: Never    Smokeless tobacco: Never    Tobacco comments:     CAFFEINE USE- 2 CUPS COFFEE DAILY   Vaping Use    Vaping status: Never Used   Substance and Sexual Activity    Alcohol use: Yes     Alcohol/week: 7.0 standard drinks of alcohol     Types: 7 Shots of liquor per week     Comment: Social Drinker    Drug use: No    Sexual activity: Never     Partners: Female       Family History   Problem Relation Age of Onset    Cancer Mother         Breast    Early death Mother     Heart disease Father     Cancer Brother 60        Colon cancer    Parkinsonism Brother         surgeon - [place]    Mal Hyperthermia Neg Hx        Review of Systems   Constitutional: Negative.   HENT: Negative.     Eyes: Negative.    Cardiovascular:  Positive for irregular heartbeat.   Respiratory: Negative.     Endocrine: Negative.    Hematologic/Lymphatic:        Eliquis 5 mg twice daily   Skin: Negative.    Musculoskeletal: Negative.    Gastrointestinal: Negative.    Genitourinary: Negative.    Neurological: Negative.    Psychiatric/Behavioral: Negative.     Allergic/Immunologic: Negative.        Allergies   Allergen Reactions    Aspirin Anaphylaxis         Current Outpatient Medications:     ascorbic acid (VITAMIN C) 1000 MG tablet, Take 1 tablet by mouth Daily., Disp: , Rfl:     Azelastine  "HCl 137 MCG/SPRAY solution, 1 spray As Needed. USE 1 TO 2 SPRAY(S) IN EACH NOSTRIL TWICE DAILY AS NEEDED FOR BREAKTHROUGH RHINITIS SYMPTOMS, Disp: , Rfl:     B Complex Vitamins (B COMPLEX 1 PO), Take 1 tablet by mouth Daily., Disp: , Rfl:     Cyanocobalamin (VITAMIN B-12 IJ), Inject  as directed., Disp: , Rfl:     donepezil (ARICEPT) 10 MG tablet, Take 1 tablet by mouth Every Night., Disp: , Rfl:     Eliquis 5 MG tablet tablet, Take 1 tablet by mouth Every 12 (Twelve) Hours., Disp: 180 tablet, Rfl: 3    fluticasone (FLONASE) 50 MCG/ACT nasal spray, 2 sprays Daily., Disp: , Rfl:     levothyroxine (SYNTHROID, LEVOTHROID) 50 MCG tablet, Take 1 tablet by mouth Daily., Disp: , Rfl:     Multiple Vitamin tablet, Take 1 tablet by mouth Daily., Disp: , Rfl:     PATIENT SUPPLIED ALLERGY INJECTION, Inject  under the skin into the appropriate area as directed Every 30 (Thirty) Days., Disp: , Rfl:     pravastatin (PRAVACHOL) 40 MG tablet, TAKE 1 TABLET BY MOUTH ONCE DAILY (Patient taking differently: Take 1 tablet by mouth Every Night.), Disp: 90 tablet, Rfl: 3      Objective:     Vitals:    03/25/24 1229   BP: 122/64   Pulse: 106   Weight: 66 kg (145 lb 6.4 oz)   Height: 172.7 cm (67.99\")       Body mass index is 22.11 kg/m².    PHYSICAL EXAM:    Constitutional:       Appearance: Healthy appearance. Not in distress.   Neck:      Vascular: No JVR. JVD normal.   Pulmonary:      Effort: Pulmonary effort is normal.      Breath sounds: Normal breath sounds. No wheezing. No rhonchi. No rales.   Chest:      Chest wall: Not tender to palpatation.   Cardiovascular:      PMI at left midclavicular line. Normal rate. Irregularly irregular rhythm. Normal S1. Normal S2.       Murmurs: There is no murmur.      No gallop.  No click. No rub.   Pulses:     Intact distal pulses.   Edema:     Peripheral edema absent.   Abdominal:      General: Bowel sounds are normal.      Palpations: Abdomen is soft.      Tenderness: There is no abdominal " tenderness.   Musculoskeletal: Normal range of motion.         General: No tenderness. Skin:     General: Skin is warm and dry.   Neurological:      General: No focal deficit present.      Mental Status: Alert and oriented to person, place and time.           ECG 12 Lead    Date/Time: 3/25/2024 1:09 PM  Performed by: Michelle Jackson APRN    Authorized by: Michelle Jackson APRN  Comparison: compared with previous ECG from 9/25/2023  Similar to previous ECG  Rhythm: atrial fibrillation  Ectopy: unifocal PVCs  BPM: 88            Assessment:       Diagnosis Plan   1. Other hyperlipidemia        2. AV block        3. Atrial fibrillation, persistent            Orders Placed This Encounter   Procedures    ECG 12 Lead     This order was created via procedure documentation     Order Specific Question:   Release to patient     Answer:   Routine Release [[id number]]          Plan:       1.  Sick sinus syndrome status post dual-chamber permanent pacemaker placement in 2013: 4 yrs battery life left ([place])  2.  Hyperlipidemia: On pravastatin.  Most recent lipid panel in target range.  Followed by PCP  3.  Persistent atrial fibrillation: Controlled rate without beta-blocker.  No bleeding issues on Eliquis.    Mr. Ramirez will send a remote download every 3 months.  Will follow-up with Dr. Riddle in 1 year..  He will call sooner for any questions or concerns.           Your medication list            Accurate as of March 25, 2024  1:11 PM. If you have any questions, ask your nurse or doctor.                CHANGE how you take these medications        Instructions Last Dose Given Next Dose Due   Eliquis 5 MG tablet tablet  Generic drug: apixaban  What changed: additional instructions      Take 1 tablet by mouth Every 12 (Twelve) Hours.       pravastatin 40 MG tablet  Commonly known as: PRAVACHOL  What changed: when to take this      TAKE 1 TABLET BY MOUTH ONCE DAILY              CONTINUE taking these medications         Instructions Last Dose Given Next Dose Due   ascorbic acid 1000 MG tablet  Commonly known as: VITAMIN C      Take 1 tablet by mouth Daily.       Azelastine HCl 137 MCG/SPRAY solution      1 spray As Needed. USE 1 TO 2 SPRAY(S) IN EACH NOSTRIL TWICE DAILY AS NEEDED FOR BREAKTHROUGH RHINITIS SYMPTOMS       B COMPLEX 1 PO      Take 1 tablet by mouth Daily.       donepezil 10 MG tablet  Commonly known as: ARICEPT      Take 1 tablet by mouth Every Night.       fluticasone 50 MCG/ACT nasal spray  Commonly known as: FLONASE      2 sprays Daily.       levothyroxine 50 MCG tablet  Commonly known as: SYNTHROID, LEVOTHROID      Take 1 tablet by mouth Daily.       multivitamin tablet tablet  Commonly known as: THERAGRAN      Take 1 tablet by mouth Daily.       PATIENT SUPPLIED ALLERGY INJECTION      Inject  under the skin into the appropriate area as directed Every 30 (Thirty) Days.       VITAMIN B-12 IJ      Inject  as directed.                 Where to Get Your Medications        These medications were sent to [facility] [address] [place] [address]), [place] - [address] [facility] - [phone]  - [phone]) [place] [address]      Phone: [phone]   Eliquis 5 MG tablet tablet           As always, it has been a pleasure to participate in your patient's care.      Sincerely,       SCOOTER Larios

## 2025-01-23 ENCOUNTER — TELEPHONE (OUTPATIENT)
Dept: CARDIOLOGY | Facility: CLINIC | Age: 85
End: 2025-01-23
Payer: MEDICARE

## 2025-01-23 NOTE — TELEPHONE ENCOUNTER
Caller: Carl Ramirez    Relationship: Emergency Contact    Best call back number: [phone]    What is the best time to reach you: ANY    Who are you requesting to speak with (clinical staff, provider,  specific staff member): CLINICAL      What was the call regarding: PATIENT'S WIFE CARL STATED THAT THE PATIENT FELL 01.19.25 AND HE HAS BEEN TIRED, WEAK AND SLEEPING A LOT. PATIENT'S DAUGHTER DR [place] DAILY EXAMINED PATIENT AND THINKS THAT THE PATIENT HAS A HEART MURMUR. PATIENT HAS A [employer] PACEMAKER. CARL ALSO WOULD LIKE TO KNOW WHEN THE NEXT REMOTE MONITOR WILL BE DONE FOR THE [employer] PACEMAKER.CARL WOULD LIKE A CALL BACK TO ADVISE. THANK YOU.    Is it okay if the provider responds through Monitoring Divisionhart: CALL

## 2025-01-23 NOTE — TELEPHONE ENCOUNTER
Pt's remote transmission reviewed and pt has 100% AF. Everything else appears to be normal on report.

## 2025-01-23 NOTE — TELEPHONE ENCOUNTER
"I s/w pt's wife, Diana (okay per ABDIRAHMAN).  She says that pt fell the evening of 1/19.  Diana has hearing problems and had gone to bed early so she didn't know when or where pt fell in the house.  She found him around 23:00 that evening on the bathroom floor.  Later on, she found blood on the carpet in the closet area.  She is unsure where the blood came from.  She said he hit his head and their daughter, a physician, came to evaluate pt.  Upon evaluation, she heard a new murmur.  Diana is wondering if something going on with his heart could have caused the fall.  She says that now his \"eye looks like he's been through the war\" and he has a \"great black eye\".  Pt is lucid now and has no complaints.  On 1/20, they FU with pt's PCP who recommended they monitor pt's condition closely.    Diana shares that since around 1/2 pt has been very tired, has no energy, and is very weak.  She says he will go lie down after breakfast for 2-3 hrs.  The last transmission on his device was in October.  I asked for her to go ahead and send in another transmission today so she's going to work on this.  I went ahead and added him on Monday for an appt with AL (next available appt with CS was in March).    Do you have any recommendations for this patient?    Thank you,    LORNE Rob Triage  01/23/25  10:02 EST    "

## 2025-01-27 ENCOUNTER — OFFICE VISIT (OUTPATIENT)
Dept: CARDIOLOGY | Facility: CLINIC | Age: 85
End: 2025-01-27
Payer: MEDICARE

## 2025-01-27 ENCOUNTER — HOSPITAL ENCOUNTER (OUTPATIENT)
Dept: CARDIOLOGY | Facility: HOSPITAL | Age: 85
Discharge: HOME | End: 2025-01-27
Payer: MEDICARE

## 2025-01-27 ENCOUNTER — TELEPHONE (OUTPATIENT)
Dept: CARDIOLOGY | Facility: CLINIC | Age: 85
End: 2025-01-27

## 2025-01-27 VITALS
BODY MASS INDEX: 21.37 KG/M2 | SYSTOLIC BLOOD PRESSURE: 122 MMHG | WEIGHT: 141 LBS | DIASTOLIC BLOOD PRESSURE: 76 MMHG | HEIGHT: 68 IN

## 2025-01-27 VITALS
OXYGEN SATURATION: 96 % | BODY MASS INDEX: 21.43 KG/M2 | DIASTOLIC BLOOD PRESSURE: 70 MMHG | SYSTOLIC BLOOD PRESSURE: 130 MMHG | HEART RATE: 55 BPM | HEIGHT: 68 IN | WEIGHT: 141.4 LBS

## 2025-01-27 DIAGNOSIS — Z95.0 CARDIAC PACEMAKER: ICD-10-CM

## 2025-01-27 DIAGNOSIS — I49.3 PVC (PREMATURE VENTRICULAR CONTRACTION): ICD-10-CM

## 2025-01-27 DIAGNOSIS — R53.83 OTHER FATIGUE: Primary | ICD-10-CM

## 2025-01-27 DIAGNOSIS — I49.5 SICK SINUS SYNDROME: ICD-10-CM

## 2025-01-27 DIAGNOSIS — R01.1 HEART MURMUR: ICD-10-CM

## 2025-01-27 DIAGNOSIS — I48.19 ATRIAL FIBRILLATION, PERSISTENT: ICD-10-CM

## 2025-01-27 DIAGNOSIS — R53.83 OTHER FATIGUE: ICD-10-CM

## 2025-01-27 DIAGNOSIS — R55 SYNCOPE AND COLLAPSE: ICD-10-CM

## 2025-01-27 DIAGNOSIS — I48.19 ATRIAL FIBRILLATION, PERSISTENT: Primary | ICD-10-CM

## 2025-01-27 LAB
AORTIC ARCH: 3.6 CM
ASCENDING AORTA: 3.6 CM
AV MEAN PRESS GRAD SYS DOP V1V2: 3 MMHG
AV VMAX SYS DOP: 112 CM/SEC
BH CV ECHO MEAS - ACS: 2.32 CM
BH CV ECHO MEAS - AI P1/2T: 808.5 MSEC
BH CV ECHO MEAS - AO MAX PG: 5 MMHG
BH CV ECHO MEAS - AO ROOT DIAM: 4.2 CM
BH CV ECHO MEAS - AO V2 VTI: 20.7 CM
BH CV ECHO MEAS - AVA(I,D): 2.6 CM2
BH CV ECHO MEAS - EDV(CUBED): 74.8 ML
BH CV ECHO MEAS - EDV(MOD-SP2): 127 ML
BH CV ECHO MEAS - EDV(MOD-SP4): 135 ML
BH CV ECHO MEAS - EF(MOD-SP2): 60.6 %
BH CV ECHO MEAS - EF(MOD-SP4): 65.9 %
BH CV ECHO MEAS - ESV(CUBED): 19.6 ML
BH CV ECHO MEAS - ESV(MOD-SP2): 50 ML
BH CV ECHO MEAS - ESV(MOD-SP4): 46 ML
BH CV ECHO MEAS - FS: 36 %
BH CV ECHO MEAS - IVS/LVPW: 1.4 CM
BH CV ECHO MEAS - IVSD: 1.19 CM
BH CV ECHO MEAS - LAT PEAK E' VEL: 14.5 CM/SEC
BH CV ECHO MEAS - LV DIASTOLIC VOL/BSA (35-75): 76.6 CM2
BH CV ECHO MEAS - LV MASS(C)D: 141.8 GRAMS
BH CV ECHO MEAS - LV MAX PG: 3 MMHG
BH CV ECHO MEAS - LV MEAN PG: 1 MMHG
BH CV ECHO MEAS - LV SYSTOLIC VOL/BSA (12-30): 26.1 CM2
BH CV ECHO MEAS - LV V1 MAX: 87 CM/SEC
BH CV ECHO MEAS - LV V1 VTI: 14.2 CM
BH CV ECHO MEAS - LVIDD: 4.2 CM
BH CV ECHO MEAS - LVIDS: 2.7 CM
BH CV ECHO MEAS - LVOT AREA: 3.8 CM2
BH CV ECHO MEAS - LVOT DIAM: 2.2 CM
BH CV ECHO MEAS - LVPWD: 0.85 CM
BH CV ECHO MEAS - MED PEAK E' VEL: 9.9 CM/SEC
BH CV ECHO MEAS - MR MAX PG: 61.6 MMHG
BH CV ECHO MEAS - MR MAX VEL: 392.5 CM/SEC
BH CV ECHO MEAS - MV A DUR: 0.11 SEC
BH CV ECHO MEAS - MV DEC SLOPE: 558.2 CM/SEC2
BH CV ECHO MEAS - MV DEC TIME: 0.19 SEC
BH CV ECHO MEAS - MV E MAX VEL: 117 CM/SEC
BH CV ECHO MEAS - MV MAX PG: 7.4 MMHG
BH CV ECHO MEAS - MV MEAN PG: 2.5 MMHG
BH CV ECHO MEAS - MV P1/2T: 73.4 MSEC
BH CV ECHO MEAS - MV V2 VTI: 19.8 CM
BH CV ECHO MEAS - MVA(P1/2T): 3 CM2
BH CV ECHO MEAS - MVA(VTI): 2.7 CM2
BH CV ECHO MEAS - PA ACC TIME: 0.11 SEC
BH CV ECHO MEAS - PA V2 MAX: 80.1 CM/SEC
BH CV ECHO MEAS - QP/QS: 0.72
BH CV ECHO MEAS - RAP SYSTOLE: 3 MMHG
BH CV ECHO MEAS - RV MAX PG: 1.54 MMHG
BH CV ECHO MEAS - RV V1 MAX: 62 CM/SEC
BH CV ECHO MEAS - RV V1 VTI: 11.7 CM
BH CV ECHO MEAS - RVOT DIAM: 2.05 CM
BH CV ECHO MEAS - RVSP: 22.2 MMHG
BH CV ECHO MEAS - SV(LVOT): 53.9 ML
BH CV ECHO MEAS - SV(MOD-SP2): 77 ML
BH CV ECHO MEAS - SV(MOD-SP4): 89 ML
BH CV ECHO MEAS - SV(RVOT): 38.7 ML
BH CV ECHO MEAS - SVI(LVOT): 30.6 ML/M2
BH CV ECHO MEAS - SVI(MOD-SP2): 43.7 ML/M2
BH CV ECHO MEAS - SVI(MOD-SP4): 50.5 ML/M2
BH CV ECHO MEAS - TAPSE (>1.6): 2.5 CM
BH CV ECHO MEAS - TR MAX PG: 19.2 MMHG
BH CV ECHO MEAS - TR MAX VEL: 218.9 CM/SEC
BH CV ECHO MEASUREMENTS AVERAGE E/E' RATIO: 9.59
BH CV XLRA - RV BASE: 3.2 CM
BH CV XLRA - RV LENGTH: 8.8 CM
BH CV XLRA - RV MID: 2.7 CM
BH CV XLRA - TDI S': 14.4 CM/SEC
FOLATE SERPL-MCNC: >20 NG/ML (ref 4.78–24.2)
IRON 24H UR-MRATE: 122 MCG/DL (ref 59–158)
IRON SATN MFR SERPL: 28 % (ref 20–50)
LEFT ATRIUM VOLUME INDEX: 50.9 ML/M2
LV EF BIPLANE MOD: 61.1 %
SINUS: 3.8 CM
STJ: 3 CM
T-UPTAKE NFR SERPL: 0.97 TBI (ref 0.8–1.3)
T4 SERPL-MCNC: 9.31 MCG/DL (ref 4.5–11.7)
TIBC SERPL-MCNC: 435 MCG/DL (ref 298–536)
TRANSFERRIN SERPL-MCNC: 292 MG/DL (ref 200–360)
TSH SERPL DL<=0.05 MIU/L-ACNC: 0.89 UIU/ML (ref 0.27–4.2)
VIT B12 BLD-MCNC: 1001 PG/ML (ref 211–946)

## 2025-01-27 PROCEDURE — 99214 OFFICE O/P EST MOD 30 MIN: CPT | Performed by: NURSE PRACTITIONER

## 2025-01-27 PROCEDURE — 1159F MED LIST DOCD IN RCRD: CPT | Performed by: NURSE PRACTITIONER

## 2025-01-27 PROCEDURE — 84443 ASSAY THYROID STIM HORMONE: CPT | Performed by: NURSE PRACTITIONER

## 2025-01-27 PROCEDURE — 36415 COLL VENOUS BLD VENIPUNCTURE: CPT

## 2025-01-27 PROCEDURE — 1160F RVW MEDS BY RX/DR IN RCRD: CPT | Performed by: NURSE PRACTITIONER

## 2025-01-27 PROCEDURE — 84479 ASSAY OF THYROID (T3 OR T4): CPT | Performed by: NURSE PRACTITIONER

## 2025-01-27 PROCEDURE — 82607 VITAMIN B-12: CPT | Performed by: NURSE PRACTITIONER

## 2025-01-27 PROCEDURE — 83540 ASSAY OF IRON: CPT | Performed by: NURSE PRACTITIONER

## 2025-01-27 PROCEDURE — 82746 ASSAY OF FOLIC ACID SERUM: CPT | Performed by: NURSE PRACTITIONER

## 2025-01-27 PROCEDURE — 93306 TTE W/DOPPLER COMPLETE: CPT | Performed by: INTERNAL MEDICINE

## 2025-01-27 PROCEDURE — 93000 ELECTROCARDIOGRAM COMPLETE: CPT | Performed by: NURSE PRACTITIONER

## 2025-01-27 PROCEDURE — 84436 ASSAY OF TOTAL THYROXINE: CPT | Performed by: NURSE PRACTITIONER

## 2025-01-27 PROCEDURE — 93306 TTE W/DOPPLER COMPLETE: CPT

## 2025-01-27 PROCEDURE — 84466 ASSAY OF TRANSFERRIN: CPT | Performed by: NURSE PRACTITIONER

## 2025-01-27 NOTE — TELEPHONE ENCOUNTER
Please inform patient/spouse that his echocardiogram shows his heart is strong and he has mild aortic valve regurgitation and mild mitral valve regurgitation which was seen in 2016 so there is no significant change.  Iron panel, folate, thyroid function all normal . He is not low on B 12 either which is good.  I think he needs to increase physical activity which would help with his recent fatigue.  At this time, we will plan to keep the appointment in April as scheduled

## 2025-01-27 NOTE — PROGRESS NOTES
Date of Office Visit: 25  Encounter Provider: SCOOTER Leavitt  Place of Service: [facility] CARDIOLOGY  Patient Name: Jean Claude Ramirez  :1940    Chief Complaint   Patient presents with    Follow-up   :     HPI: Jean Claude Ramirez is a 84 y.o. male  with hyperlipidemia, sick sinus syndrome status post permanent pacemaker, atrial fibrillation, premature ventricular contraction, hypothyroidism and memory loss.      He had permanent pacemaker implanted in .  In 2016 he had echocardiogram which showed normal left ventricular systolic function, mild aortic valve regurgitation, thickened mitral valve with mild regurgitation, mild pulmonary hypertension with RVSP 35-40 mmHg.  Perfusion stress test at that time showed no evidence of ischemia with transient high grade AV block during Lexiscan infusion.  In 2023 he had 100% atrial fibrillation burden.    He presents today for evaluation of feeling very tired and having low energy and weakness.  He had ER evaluation 2025 for fatigue and left foot pain.  CMP and CBC were unrevealing.  CTA of the head and neck showed no acute intracranial process and mild carotid stenosis.  He was treated with prednisone and meclizine.  The meclizine helped his dizziness.  He fell the evening of 2025.  He called his PCP who advised to monitor his symptoms and bruising but he remained alert.  Patient states he does not recall falling.  He is not sure if he passed out.  He does not recall feeling dizzy prior to the event.  He reports increased fatigue.  Has been out of routine habit of walking for the past month and a half.  No chest pain or shortness of breath complaint.      Allergies   Allergen Reactions    Aspirin Anaphylaxis           Family and social history reviewed.     ROS  All other systems were reviewed and are negative          Objective:     Vitals:    25 1354   BP: 130/70   BP Location: Left arm   Patient Position:  "Sitting   Cuff Size: Adult   Pulse: 55   SpO2: 96%   Weight: 64.1 kg (141 lb 6.4 oz)   Height: 172.7 cm (68\")     Body mass index is 21.5 kg/m².    PHYSICAL EXAM:  Pulmonary:      Effort: Pulmonary effort is normal.      Breath sounds: Normal breath sounds.   Cardiovascular:      Normal rate. Irregularly irregular rhythm.      Murmurs: There is a grade 1/6 systolic murmur at the LLSB.           ECG 12 Lead    Date/Time: 1/27/2025 2:20 PM  Performed by: Maria Esther Lock APRN    Authorized by: Maria Esther Lock APRN  Comparison: compared with previous ECG   Similar to previous ECG  Rhythm: atrial fibrillation and paced  Ectopy: unifocal PVCs    Clinical impression: abnormal EKG            Current Outpatient Medications   Medication Sig Dispense Refill    ascorbic acid (VITAMIN C) 1000 MG tablet Take 1 tablet by mouth Daily.      Azelastine HCl 137 MCG/SPRAY solution 1 spray As Needed. USE 1 TO 2 SPRAY(S) IN EACH NOSTRIL TWICE DAILY AS NEEDED FOR BREAKTHROUGH RHINITIS SYMPTOMS      B Complex Vitamins (B COMPLEX 1 PO) Take 1 tablet by mouth Daily.      Cyanocobalamin (VITAMIN B-12 IJ) Inject  as directed.      donepezil (ARICEPT) 10 MG tablet Take 1 tablet by mouth Every Night.      Eliquis 5 MG tablet tablet Take 1 tablet by mouth Every 12 (Twelve) Hours. 180 tablet 3    fluticasone (FLONASE) 50 MCG/ACT nasal spray 2 sprays Daily.      levothyroxine (SYNTHROID, LEVOTHROID) 50 MCG tablet Take 1 tablet by mouth Daily.      Multiple Vitamin tablet Take 1 tablet by mouth Daily.      PATIENT SUPPLIED ALLERGY INJECTION Inject  under the skin into the appropriate area as directed Every 30 (Thirty) Days.      pravastatin (PRAVACHOL) 40 MG tablet TAKE 1 TABLET BY MOUTH ONCE DAILY (Patient taking differently: Take 1 tablet by mouth Every Night.) 90 tablet 3     No current facility-administered medications for this visit.     Assessment:       Diagnosis Plan   1. Atrial fibrillation, persistent  ECG 12 Lead      2. Sick sinus syndrome "  ECG 12 Lead      3. Cardiac pacemaker  ECG 12 Lead           Orders Placed This Encounter   Procedures    ECG 12 Lead     This order was created via procedure documentation     Order Specific Question:   Release to patient     Answer:   Routine Release [[id number]]         Plan:   1.  84-year-old gentleman with fall versus syncope.  His CMP and CBC on 1/14/2025 in the ER were unremarkable.  Given his increased fatigue I will check thyroid panel, B12, folate and iron profile today.  His device interrogation showed no new events.  I am working to add him on the echocardiogram scheduled today  2.  Mild cardiac murmur-left lower sternal border.  Per chart review this was appreciated in 2016.  As above check echo  3.  Hypertension-blood pressure appears well-controlled today  4. Hypothyroidism replacement therapy will check thyroid panel  5.  Chronic A-fib.  Elevated CHADS2 Vascor of 2 anticoagulated with Eliquis  6.  Sick sinus syndrome status post permanent pacemaker-we reviewed his transmission recently send and there was no new events  7.  PVC-these are known to patient  8.Mild carotid stenosis -CTA head/neck 1/14/25  9.  History of gout-he was treated with prednisone earlier this month and his left foot pain improved              It has been a pleasure to participate in this patient's care.      Thank you,  SCOOTER Leavitt      **I used Dragon to dictate this note:**

## 2025-01-28 NOTE — TELEPHONE ENCOUNTER
Notified patient's spouse of results/recommendations, allowed per ABDIRAHMAN. She verbalized understanding.    Wendy Monae RN  Triage [facility]

## 2025-03-26 ENCOUNTER — TELEPHONE (OUTPATIENT)
Age: 85
End: 2025-03-26
Payer: MEDICARE

## 2025-03-26 NOTE — TELEPHONE ENCOUNTER
Jean Claude's wife called. He is needing clearance to have dental surgery. She wants to knwo if he is clear and if he can hold his Eliquis for 2 days prior to his procedure.    We saw him last on 1/27/25. No previous cath or PCI. He is on Eliquis 5mg for AFib.    Please advise if he is clear and if he can hold his Eliquis for 2 days.    Thanks,  Meg

## 2025-03-31 NOTE — TELEPHONE ENCOUNTER
I called and left a voicemail on both number listed. The pts mobile line cut off while I left a voicemail

## 2025-04-11 ENCOUNTER — CLINICAL SUPPORT NO REQUIREMENTS (OUTPATIENT)
Age: 85
End: 2025-04-11
Payer: MEDICARE

## 2025-04-11 ENCOUNTER — OFFICE VISIT (OUTPATIENT)
Dept: CARDIOLOGY | Age: 85
End: 2025-04-11
Payer: MEDICARE

## 2025-04-11 VITALS
SYSTOLIC BLOOD PRESSURE: 120 MMHG | DIASTOLIC BLOOD PRESSURE: 76 MMHG | WEIGHT: 144 LBS | BODY MASS INDEX: 21.82 KG/M2 | HEIGHT: 68 IN | HEART RATE: 82 BPM

## 2025-04-11 DIAGNOSIS — I44.30 AV BLOCK: ICD-10-CM

## 2025-04-11 DIAGNOSIS — I48.19 ATRIAL FIBRILLATION, PERSISTENT: Primary | ICD-10-CM

## 2025-04-11 DIAGNOSIS — Z95.0 CARDIAC PACEMAKER: ICD-10-CM

## 2025-04-11 DIAGNOSIS — E78.49 OTHER HYPERLIPIDEMIA: ICD-10-CM

## 2025-04-11 PROCEDURE — 99214 OFFICE O/P EST MOD 30 MIN: CPT | Performed by: NURSE PRACTITIONER

## 2025-04-11 PROCEDURE — 93000 ELECTROCARDIOGRAM COMPLETE: CPT | Performed by: NURSE PRACTITIONER

## 2025-04-11 PROCEDURE — 1160F RVW MEDS BY RX/DR IN RCRD: CPT | Performed by: NURSE PRACTITIONER

## 2025-04-11 PROCEDURE — 1159F MED LIST DOCD IN RCRD: CPT | Performed by: NURSE PRACTITIONER

## 2025-04-11 RX ORDER — ALLOPURINOL 100 MG/1
100 TABLET ORAL 2 TIMES DAILY
COMMUNITY
Start: 2025-01-23

## 2025-04-11 RX ORDER — FEXOFENADINE HCL 180 MG/1
180 TABLET ORAL DAILY
COMMUNITY

## 2025-04-11 RX ORDER — MEMANTINE HYDROCHLORIDE 5 MG/1
5 TABLET ORAL DAILY
COMMUNITY
Start: 2025-04-10

## 2025-04-11 NOTE — PROGRESS NOTES
Date of Office Visit: 2025  Encounter Provider: SCOOTER Molina  Place of Service: [facility] CARDIOLOGY  Patient Name: Jean Claude Ramirez  :1940    Chief Complaint   Patient presents with    Atrial Fibrillation   :     HPI: Jean Claude Ramirez is a 84 y.o. male patient of Dr. Murdock with hyperlipidemia, sick sinus syndrome (status post permanent pacemaker), and atrial fibrillation.    He was last seen in the office by SCOOTER Leavitt in January at which time he reported fatigue and weakness.  He also reported a recent fall.  He could not say whether or not he lost consciousness.  Device interrogation was unremarkable.  An echocardiogram was obtained the same day which demonstrated normal LV function and no significant valvular abnormalities.  Labs were also done including an iron panel, folate, and thyroid function.  He was advised to increase his physical activity.  He is here today for follow-up.    He has been feeling well.  The fatigue has completely resolved.  He denies any recurrent falls.  He denies any chest pain, shortness of breath, palpitations, edema, dizziness, syncope, bleeding difficulties or melena.    Past Medical History:   Diagnosis Date    Abnormal ECG 2023    AFib    Acquired hypothyroidism 2016    Atypical chest pain 2016    AV block     Cancer 2010    Prostate surgery    Colon polyp 16    H/O echocardiogram 2013    Health care maintenance     History of EKG 2015    Hyperlipidemia 2016    Inguinal hernia     Medicare annual wellness visit, subsequent 12/15/2017    Mobitz type II block     Pacemaker        Past Surgical History:   Procedure Laterality Date    CARDIAC PACEMAKER PLACEMENT      EYE SURGERY      Cataracs    FRACTURE SURGERY  10/6/10    Rotator cup surgery    INGUINAL HERNIA REPAIR Left 2023    Procedure: left INGUINAL HERNIA REPAIR with mesh;  Surgeon: Dwaine David MD;   Location: [facility] MAIN OR;  Service: General;  Laterality: Left;    PACEMAKER IMPLANTATION  01/25/2013    PROSTATE SURGERY      PROSTHODONTIC PROCEDURE      SHOULDER SURGERY Left        Social History     Socioeconomic History    Marital status:    Tobacco Use    Smoking status: Never    Smokeless tobacco: Never    Tobacco comments:     CAFFEINE USE- 2 CUPS COFFEE DAILY   Vaping Use    Vaping status: Never Used   Substance and Sexual Activity    Alcohol use: Yes     Alcohol/week: 7.0 standard drinks of alcohol     Types: 7 Shots of liquor per week     Comment: Social Drinker    Drug use: No    Sexual activity: Never     Partners: Female       Family History   Problem Relation Age of Onset    Cancer Mother         Breast    Early death Mother     Heart disease Father     Cancer Brother 60        Colon cancer    Parkinsonism Brother         surgeon - [place]    Mal Hyperthermia Neg Hx        Review of Systems   Constitutional: Negative.   Cardiovascular: Negative.  Negative for chest pain, dyspnea on exertion, leg swelling, orthopnea, paroxysmal nocturnal dyspnea and syncope.   Respiratory: Negative.     Hematologic/Lymphatic: Negative for bleeding problem.   Musculoskeletal:  Negative for falls.   Gastrointestinal:  Negative for melena.   Neurological:  Negative for dizziness and light-headedness.       Allergies   Allergen Reactions    Aspirin Anaphylaxis         Current Outpatient Medications:     allopurinol (ZYLOPRIM) 100 MG tablet, Take 1 tablet by mouth 2 (Two) Times a Day., Disp: , Rfl:     ascorbic acid (VITAMIN C) 1000 MG tablet, Take 1 tablet by mouth Daily., Disp: , Rfl:     Azelastine HCl 137 MCG/SPRAY solution, 1 spray As Needed. USE 1 TO 2 SPRAY(S) IN EACH NOSTRIL TWICE DAILY AS NEEDED FOR BREAKTHROUGH RHINITIS SYMPTOMS, Disp: , Rfl:     B Complex Vitamins (B COMPLEX 1 PO), Take 1 tablet by mouth Daily., Disp: , Rfl:     Cyanocobalamin (VITAMIN B-12 IJ), Inject  as directed., Disp: , Rfl:  "    donepezil (ARICEPT) 10 MG tablet, Take 1 tablet by mouth Every Night., Disp: , Rfl:     Eliquis 5 MG tablet tablet, Take 1 tablet by mouth Every 12 (Twelve) Hours., Disp: 180 tablet, Rfl: 3    fexofenadine (ALLEGRA) 180 MG tablet, Take 1 tablet by mouth Daily., Disp: , Rfl:     fluticasone (FLONASE) 50 MCG/ACT nasal spray, 2 sprays Daily., Disp: , Rfl:     levothyroxine (SYNTHROID, LEVOTHROID) 50 MCG tablet, Take 1 tablet by mouth Daily., Disp: , Rfl:     memantine (NAMENDA) 5 MG tablet, Take 1 tablet by mouth Daily., Disp: , Rfl:     Multiple Vitamin tablet, Take 1 tablet by mouth Daily., Disp: , Rfl:     PATIENT SUPPLIED ALLERGY INJECTION, Inject  under the skin into the appropriate area as directed Every 30 (Thirty) Days., Disp: , Rfl:     pravastatin (PRAVACHOL) 40 MG tablet, TAKE 1 TABLET BY MOUTH ONCE DAILY (Patient taking differently: Take 1 tablet by mouth Every Night.), Disp: 90 tablet, Rfl: 3      Objective:     Vitals:    04/11/25 1104   BP: 120/76   Pulse: 82   Weight: 65.3 kg (144 lb)   Height: 172.7 cm (68\")     Body mass index is 21.9 kg/m².    PHYSICAL EXAM:    Neck:      Vascular: No JVD.   Pulmonary:      Effort: Pulmonary effort is normal.      Breath sounds: Normal breath sounds.   Cardiovascular:      Normal rate. Regular rhythm.      Murmurs: There is no murmur.      No gallop.  No click. No rub.   Pulses:     Intact distal pulses.           ECG 12 Lead    Date/Time: 4/11/2025 11:06 AM  Performed by: Roxane Palacio APRN    Authorized by: Roxane Palacio APRN  Comparison: compared with previous ECG from 1/27/2025  Similar to previous ECG  Rhythm: paced  Rate: normal  BPM: 82            Assessment:       Diagnosis Plan   1. Atrial fibrillation, persistent  ECG 12 Lead      2. AV block        3. Cardiac pacemaker        4. Other hyperlipidemia          Orders Placed This Encounter   Procedures    ECG 12 Lead     This order was created via procedure documentation     Release to " patient:   Routine Release [[id number]]          Plan:       1.  Persistent atrial fibrillation.  He is rate controlled.  He is anticoagulated with Eliquis.      2.  AV block.  Status post permanent pacemaker.      3.  Hyperlipidemia.  Continue pravastatin.      I think he is doing well.  I am not making any changes, and he will follow-up with Dr. Riddle in 6 months.      As always, it has been a pleasure to participate in your patient's care.      Sincerely,         SCOOTER Montelongo

## 2025-05-01 RX ORDER — APIXABAN 5 MG/1
5 TABLET, FILM COATED ORAL EVERY 12 HOURS SCHEDULED
Qty: 180 TABLET | Refills: 1 | Status: SHIPPED | OUTPATIENT
Start: 2025-05-01

## 2025-07-29 LAB
MC_CV_MDC_IDC_RATE_1: 180
MC_CV_MDC_IDC_ZONE_ID: 2
MDC_IDC_MSMT_BATTERY_REMAINING_LONGEVITY: 32 MO
MDC_IDC_MSMT_BATTERY_STATUS: NORMAL
MDC_IDC_MSMT_BATTERY_VOLTAGE: 2.72
MDC_IDC_MSMT_LEADCHNL_RA_IMPEDANCE_VALUE: 1050
MDC_IDC_MSMT_LEADCHNL_RV_DTM: NORMAL
MDC_IDC_MSMT_LEADCHNL_RV_IMPEDANCE_VALUE: 1131
MDC_IDC_MSMT_LEADCHNL_RV_PACING_THRESHOLD_AMPLITUDE: 1.12
MDC_IDC_MSMT_LEADCHNL_RV_PACING_THRESHOLD_PULSEWIDTH: 0.4
MDC_IDC_PG_MFG: NORMAL
MDC_IDC_PG_MODEL: NORMAL
MDC_IDC_PG_SERIAL: NORMAL
MDC_IDC_PG_TYPE: NORMAL
MDC_IDC_SESS_DTM: NORMAL
MDC_IDC_SESS_TYPE: NORMAL
MDC_IDC_SET_BRADY_AT_MODE_SWITCH_RATE: 150
MDC_IDC_SET_BRADY_LOWRATE: 60
MDC_IDC_SET_BRADY_MAX_SENSOR_RATE: 130
MDC_IDC_SET_BRADY_MAX_TRACKING_RATE: 130
MDC_IDC_SET_BRADY_MODE: NORMAL
MDC_IDC_SET_BRADY_PAV_DELAY: 180
MDC_IDC_SET_BRADY_SAV_DELAY: 150
MDC_IDC_SET_LEADCHNL_RA_PACING_AMPLITUDE: 2.5
MDC_IDC_SET_LEADCHNL_RA_PACING_POLARITY: NORMAL
MDC_IDC_SET_LEADCHNL_RA_PACING_PULSEWIDTH: 0.4
MDC_IDC_SET_LEADCHNL_RA_SENSING_POLARITY: NORMAL
MDC_IDC_SET_LEADCHNL_RA_SENSING_SENSITIVITY: 0.18
MDC_IDC_SET_LEADCHNL_RV_PACING_AMPLITUDE: 2.25
MDC_IDC_SET_LEADCHNL_RV_PACING_POLARITY: NORMAL
MDC_IDC_SET_LEADCHNL_RV_PACING_PULSEWIDTH: 0.4
MDC_IDC_SET_LEADCHNL_RV_SENSING_POLARITY: NORMAL
MDC_IDC_SET_LEADCHNL_RV_SENSING_SENSITIVITY: 4
MDC_IDC_SET_ZONE_STATUS: NORMAL
MDC_IDC_SET_ZONE_TYPE: NORMAL
MDC_IDC_STAT_AT_BURDEN_PERCENT: 100

## (undated) DEVICE — SUT ETHIB 0/0 MO6 I8IN CX45D

## (undated) DEVICE — TRAP FLD MINIVAC MEGADYNE 100ML

## (undated) DEVICE — NDL HYPO PRECISIONGLIDE REG 25G 1 1/2

## (undated) DEVICE — ELECTRD BLD EZ CLN MOD XLNG 2.75IN

## (undated) DEVICE — STRIP,CLOSURE,WOUND,MEDI-STRIP,1/2X4: Brand: MEDLINE

## (undated) DEVICE — ANTIBACTERIAL UNDYED BRAIDED (POLYGLACTIN 910), SYNTHETIC ABSORBABLE SUTURE: Brand: COATED VICRYL

## (undated) DEVICE — TBG PENCL TELESCP MEGADYNE SMOKE EVAC 10FT

## (undated) DEVICE — SUT PDS 2/0 CT2 27IN Z333H

## (undated) DEVICE — DRSNG SURESITE WNDW 4X4.5

## (undated) DEVICE — APPL CHLORAPREP HI/LITE 26ML ORNG

## (undated) DEVICE — SPNG LAP 18X18IN LF STRL PK/5

## (undated) DEVICE — GLV SURG BIOGEL LTX PF 8 1/2

## (undated) DEVICE — SUT MNCRYL PLS ANTIB UD 4/0 PS2 18IN

## (undated) DEVICE — LOU MINOR PROCEDURE: Brand: MEDLINE INDUSTRIES, INC.

## (undated) DEVICE — PENROSE DRAIN 18" X 5/8": Brand: CARDINAL HEALTH

## (undated) DEVICE — PATIENT RETURN ELECTRODE, SINGLE-USE, CONTACT QUALITY MONITORING, ADULT, WITH 9FT CORD, FOR PATIENTS WEIGING OVER 33LBS. (15KG): Brand: MEGADYNE